# Patient Record
Sex: MALE | Race: WHITE | NOT HISPANIC OR LATINO | Employment: OTHER | RURAL
[De-identification: names, ages, dates, MRNs, and addresses within clinical notes are randomized per-mention and may not be internally consistent; named-entity substitution may affect disease eponyms.]

---

## 2020-08-11 ENCOUNTER — OFFICE VISIT (OUTPATIENT)
Dept: FAMILY MEDICINE CLINIC | Facility: CLINIC | Age: 40
End: 2020-08-11

## 2020-08-11 VITALS
DIASTOLIC BLOOD PRESSURE: 80 MMHG | SYSTOLIC BLOOD PRESSURE: 121 MMHG | WEIGHT: 222.8 LBS | OXYGEN SATURATION: 97 % | HEART RATE: 65 BPM | TEMPERATURE: 98.2 F | RESPIRATION RATE: 20 BRPM | HEIGHT: 74 IN | BODY MASS INDEX: 28.59 KG/M2

## 2020-08-11 DIAGNOSIS — F17.200 TOBACCO USE DISORDER: ICD-10-CM

## 2020-08-11 DIAGNOSIS — G35 MULTIPLE SCLEROSIS (HCC): Primary | ICD-10-CM

## 2020-08-11 PROBLEM — K21.9 ESOPHAGEAL REFLUX: Status: ACTIVE | Noted: 2020-08-11

## 2020-08-11 PROBLEM — J30.9 ALLERGIC RHINITIS: Status: ACTIVE | Noted: 2020-08-11

## 2020-08-11 PROBLEM — E78.5 HYPERLIPIDEMIA: Status: ACTIVE | Noted: 2020-08-11

## 2020-08-11 PROBLEM — E66.3 OVERWEIGHT (BMI 25.0-29.9): Status: ACTIVE | Noted: 2020-08-11

## 2020-08-11 PROCEDURE — 99213 OFFICE O/P EST LOW 20 MIN: CPT | Performed by: FAMILY MEDICINE

## 2020-08-11 RX ORDER — FLUTICASONE PROPIONATE 50 MCG
SPRAY, SUSPENSION (ML) NASAL
COMMUNITY
Start: 2015-11-16 | End: 2021-06-23

## 2020-08-11 RX ORDER — AMITRIPTYLINE HYDROCHLORIDE 25 MG/1
25 TABLET, FILM COATED ORAL DAILY
COMMUNITY
Start: 2015-11-19 | End: 2020-08-15

## 2020-08-11 RX ORDER — FINGOLIMOD HYDROCHLORIDE 0.5 MG/1
0.5 CAPSULE ORAL DAILY
COMMUNITY
Start: 2015-10-14 | End: 2020-08-15

## 2020-08-11 RX ORDER — GABAPENTIN 100 MG/1
100 CAPSULE ORAL 3 TIMES DAILY
Qty: 90 CAPSULE | Refills: 2 | Status: SHIPPED | OUTPATIENT
Start: 2020-08-11 | End: 2020-11-10 | Stop reason: SDUPTHER

## 2020-08-11 RX ORDER — CYCLOBENZAPRINE HCL 10 MG
10 TABLET ORAL
COMMUNITY
Start: 2015-10-05 | End: 2020-08-15

## 2020-08-11 RX ORDER — BUTALBITAL, ACETAMINOPHEN AND CAFFEINE 50; 325; 40 MG/1; MG/1; MG/1
1 TABLET ORAL
COMMUNITY
Start: 2015-08-19 | End: 2020-08-15

## 2020-08-11 RX ORDER — LANSOPRAZOLE 30 MG/1
CAPSULE, DELAYED RELEASE ORAL
COMMUNITY
Start: 2014-07-27 | End: 2021-06-23

## 2020-08-11 RX ORDER — MELOXICAM 15 MG/1
15 TABLET ORAL DAILY
COMMUNITY
Start: 2015-11-19 | End: 2020-08-15

## 2020-08-11 RX ORDER — CHLORPHENIRAMINE MALEATE 4 MG/1
4 TABLET ORAL
COMMUNITY
End: 2020-08-15

## 2020-08-11 NOTE — PROGRESS NOTES
Subjective   Frederic Wood is a 39 y.o. male.     Chief Complaint   Patient presents with   • Multiple Sclerosis       Migraine    This is a recurrent problem. The current episode started more than 1 year ago. The problem occurs constantly. The problem has been gradually worsening. The pain is located in the occipital, temporal and frontal region. The pain radiates to the left shoulder, left neck and left arm. The pain quality is not similar to prior headaches. Associated symptoms include blurred vision, dizziness, eye pain, a loss of balance, neck pain, numbness, tingling, a visual change and weakness. Pertinent negatives include no abdominal pain, abnormal behavior, anorexia, coughing, nausea or seizures. Associated symptoms comments:   . The symptoms are aggravated by activity, fatigue, emotional stress, weather changes and food (Heat, chocolate, sugar). Treatments tried: MS medication. The treatment provided no relief. His past medical history is significant for migraine headaches and obesity.            I personally reviewed and updated the patient's allergies, medications, problem list, and past medical, surgical, social, and family history.     Family History   Problem Relation Age of Onset   • Osteoporosis Mother    • Diabetes Mother    • Hypertension Mother    • Parkinsonism Father    • Cancer Maternal Grandfather    • Colon cancer Maternal Grandfather    • Heart attack Paternal Grandmother    • Stroke Paternal Grandmother    • Heart attack Paternal Grandfather    • Stroke Paternal Grandfather        Social History     Tobacco Use   • Smoking status: Former Smoker     Last attempt to quit: 2019     Years since quittin.2   • Smokeless tobacco: Never Used   Substance Use Topics   • Alcohol use: Not Currently   • Drug use: Never       History reviewed. No pertinent surgical history.    Patient Active Problem List   Diagnosis   • Allergic rhinitis   • Esophageal reflux   • Tobacco use disorder   •  "Hyperlipidemia   • Multiple sclerosis (CMS/HCC)   • Overweight (BMI 25.0-29.9)         Current Outpatient Medications:   •  fluticasone (FLONASE) 50 MCG/ACT nasal spray, into the nostril(s) as directed by provider., Disp: , Rfl:   •  lansoprazole (PREVACID) 30 MG capsule, , Disp: , Rfl:   •  gabapentin (NEURONTIN) 100 MG capsule, Take 1 capsule by mouth 3 (Three) Times a Day., Disp: 90 capsule, Rfl: 2         Review of Systems   Constitutional: Negative for chills and diaphoresis.   HENT: Negative for trouble swallowing and voice change.    Eyes: Positive for blurred vision and pain. Negative for visual disturbance.   Respiratory: Negative for cough and shortness of breath.    Cardiovascular: Negative for chest pain and palpitations.   Gastrointestinal: Negative for abdominal pain, anorexia and nausea.   Endocrine: Negative for polydipsia and polyphagia.   Genitourinary: Negative for hematuria.   Musculoskeletal: Positive for neck pain. Negative for neck stiffness.   Skin: Negative for color change and pallor.   Allergic/Immunologic: Negative for immunocompromised state.   Neurological: Positive for dizziness, tingling, weakness, numbness and loss of balance. Negative for seizures and syncope.   Hematological: Negative for adenopathy.   Psychiatric/Behavioral: Negative for sleep disturbance and suicidal ideas.       I have reviewed and confirmed the accuracy of the ROS as documented by the MA/LPN/RN Denton Dillon MD      Objective   /80 (BP Location: Right arm, Patient Position: Sitting, Cuff Size: Adult)   Pulse 65   Temp 98.2 °F (36.8 °C)   Resp 20   Ht 188 cm (74\")   Wt 101 kg (222 lb 12.8 oz)   SpO2 97%   BMI 28.61 kg/m²   BP Readings from Last 3 Encounters:   08/11/20 121/80   04/12/17 123/79   02/03/17 116/78     Wt Readings from Last 3 Encounters:   08/11/20 101 kg (222 lb 12.8 oz)   04/12/17 85.8 kg (189 lb 3.2 oz)   02/03/17 85.3 kg (188 lb 2 oz)     Physical Exam   Constitutional: He is " oriented to person, place, and time. He appears well-developed and well-nourished.   Eyes: Pupils are equal, round, and reactive to light. Conjunctivae, EOM and lids are normal. Right eye exhibits no discharge and no exudate. No foreign body present in the right eye. Left eye exhibits no discharge and no exudate. No foreign body present in the left eye. Right conjunctiva is not injected. Right conjunctiva has no hemorrhage. Left conjunctiva is not injected. Left conjunctiva has no hemorrhage. No scleral icterus. Right eye exhibits no nystagmus. Left eye exhibits no nystagmus.   Fundoscopic exam:       The right eye shows no arteriolar narrowing, no AV nicking, no exudate, no hemorrhage and no papilledema. The right eye shows no red reflex and no venous pulsations.        The left eye shows no arteriolar narrowing, no AV nicking, no exudate, no hemorrhage and no papilledema. The left eye shows no red reflex and no venous pulsations.   Cardiovascular: Normal rate, regular rhythm, S1 normal, S2 normal, normal heart sounds, intact distal pulses and normal pulses. Exam reveals no gallop and no friction rub.   No murmur heard.  Pulmonary/Chest: Effort normal and breath sounds normal. No accessory muscle usage or stridor. He has no decreased breath sounds. He has no wheezes. He has no rhonchi. He has no rales.   Abdominal: Soft. Normal appearance, normal aorta and bowel sounds are normal. He exhibits no distension, no pulsatile midline mass and no mass. There is no hepatosplenomegaly. There is no tenderness. There is no rigidity, no rebound, no guarding, no CVA tenderness and negative Short's sign. No hernia.   Neurological: He is alert and oriented to person, place, and time. He has normal strength and normal reflexes. He displays no tremor. No cranial nerve deficit or sensory deficit. He exhibits normal muscle tone. He displays no seizure activity. Coordination and gait normal.   Skin: Skin is warm and dry. Turgor is  normal. He is not diaphoretic. No pallor.         Assessment/Plan      Medications        Problem List         LOS    MS.  Worse, significant numbness, intermittent left-sided weakness, episodes of bladder incontinence..  Has been off treatment for several years secondary/no insurance.  History of Gilenya Rx.  Recommend neurology follow-up/consider treatment follow-up with Dr. Grimaldo scheduled.  Information for pursuing disability application given.  Start gabapentin.  Risk of significant morbidity from progression of MS without treatment discussed.  Multiple lesions with active lesion on l parietal lobe corresponding with right sided weakness 2013, + prior h/o optic neuritis resulting in vision loss, has been followed by optho in the past.  Start gabapentin.  History of tobacco use.  Congrats on quitting.  Recommend follow-up visit for comprehensive physical exam and screening tests.         Problem List Items Addressed This Visit        Unprioritized    Tobacco use disorder    Multiple sclerosis (CMS/HCC) - Primary              Expected course, medications, and adverse effects discussed.  Call or return if worsening or persistent symptoms.

## 2020-11-10 ENCOUNTER — OFFICE VISIT (OUTPATIENT)
Dept: FAMILY MEDICINE CLINIC | Facility: CLINIC | Age: 40
End: 2020-11-10

## 2020-11-10 VITALS — HEIGHT: 74 IN | BODY MASS INDEX: 26.95 KG/M2 | WEIGHT: 210 LBS

## 2020-11-10 DIAGNOSIS — F17.200 TOBACCO USE DISORDER: ICD-10-CM

## 2020-11-10 DIAGNOSIS — E66.3 OVERWEIGHT (BMI 25.0-29.9): ICD-10-CM

## 2020-11-10 DIAGNOSIS — R20.0 EXTREMITY NUMBNESS: ICD-10-CM

## 2020-11-10 DIAGNOSIS — G35 MULTIPLE SCLEROSIS (HCC): Primary | ICD-10-CM

## 2020-11-10 PROCEDURE — 99443 PR PHYS/QHP TELEPHONE EVALUATION 21-30 MIN: CPT | Performed by: FAMILY MEDICINE

## 2020-11-10 RX ORDER — GABAPENTIN 100 MG/1
300 CAPSULE ORAL 3 TIMES DAILY
Qty: 90 CAPSULE | Refills: 2 | Status: SHIPPED | OUTPATIENT
Start: 2020-11-10 | End: 2020-11-30

## 2020-11-10 NOTE — PROGRESS NOTES
Subjective   Frederic Wood is a 39 y.o. male.     Chief Complaint   Patient presents with   • Multiple Sclerosis       Frederic states he would like the gabapentin increased if possible. He said it has really helped but could be better. He is still having a lot of joint and nerve pain on the right side. He said his last episode was just recently and he was really fatigue and went numb in his legs. He still has prickly feelings in his left leg from the last episode.    Migraine   This is a recurrent problem. The current episode started more than 1 year ago. The problem occurs constantly. The problem has been gradually worsening. The pain is located in the occipital, temporal and frontal region. The pain radiates to the left shoulder, left neck and left arm. The pain quality is not similar to prior headaches. Associated symptoms include blurred vision, dizziness, eye pain, a loss of balance, neck pain, numbness, tingling, a visual change and weakness. Pertinent negatives include no abdominal pain, abnormal behavior, anorexia, coughing, nausea or seizures. Associated symptoms comments:   . The symptoms are aggravated by activity, fatigue, emotional stress, weather changes and food (Heat, chocolate, sugar). Treatments tried: MS medication. The treatment provided no relief. His past medical history is significant for migraine headaches and obesity.            I personally reviewed and updated the patient's allergies, medications, problem list, and past medical, surgical, social, and family history. I have reviewed and confirmed the accuracy of the History of Present Illness and Review of Symptoms as documented by the MA/ALMAN/RN. Denton Dillon MD    Family History   Problem Relation Age of Onset   • Osteoporosis Mother    • Diabetes Mother    • Hypertension Mother    • Parkinsonism Father    • Cancer Maternal Grandfather    • Colon cancer Maternal Grandfather    • Heart attack Paternal Grandmother    • Stroke Paternal  "Grandmother    • Heart attack Paternal Grandfather    • Stroke Paternal Grandfather        Social History     Tobacco Use   • Smoking status: Former Smoker     Quit date: 2019     Years since quittin.4   • Smokeless tobacco: Never Used   Substance Use Topics   • Alcohol use: Not Currently   • Drug use: Never       History reviewed. No pertinent surgical history.    Patient Active Problem List   Diagnosis   • Allergic rhinitis   • Esophageal reflux   • Tobacco use disorder   • Hyperlipidemia   • Multiple sclerosis (CMS/HCC)   • Overweight (BMI 25.0-29.9)   • Extremity numbness         Current Outpatient Medications:   •  fluticasone (FLONASE) 50 MCG/ACT nasal spray, into the nostril(s) as directed by provider., Disp: , Rfl:   •  gabapentin (NEURONTIN) 100 MG capsule, Take 3 capsules by mouth 3 (Three) Times a Day., Disp: 90 capsule, Rfl: 2  •  lansoprazole (PREVACID) 30 MG capsule, , Disp: , Rfl:          Review of Systems   Constitutional: Negative for chills and diaphoresis.   Eyes: Positive for blurred vision and pain. Negative for visual disturbance.   Respiratory: Negative for cough and shortness of breath.    Cardiovascular: Negative for chest pain and palpitations.   Gastrointestinal: Negative for abdominal pain, anorexia and nausea.   Endocrine: Negative for polydipsia and polyphagia.   Musculoskeletal: Positive for neck pain. Negative for neck stiffness.   Skin: Negative for color change and pallor.   Neurological: Positive for dizziness, tingling, weakness, numbness and loss of balance. Negative for seizures and syncope.   Hematological: Negative for adenopathy.   Psychiatric/Behavioral: Negative for hallucinations and suicidal ideas.       I have reviewed and confirmed the accuracy of the ROS as documented by the MA/LPN/RN Denton Dillon MD      Objective   Ht 188 cm (74\")   Wt 95.3 kg (210 lb)   BMI 26.96 kg/m²   BP Readings from Last 3 Encounters:   20 121/80   17 123/79   17 " 116/78     Wt Readings from Last 3 Encounters:   11/10/20 95.3 kg (210 lb)   08/11/20 101 kg (222 lb 12.8 oz)   04/12/17 85.8 kg (189 lb 3.2 oz)     Physical Exam    Recent Lab Results:    No results found for: HGBA1C     No results found for: LDL, LDLDIRECT  No results found for: CHOL  No results found for: TRIG  No results found for: HDL  No results found for: PSA  No results found for: WBC, HGB, HCT, MCV, PLT  No results found for: TSH, R0HMPYD, F5OHXOO   No results found for: GLUCOSE, BUN, CREATININE, EGFRIFNONA, EGFRIFAFRI, BCR, K, CO2, CALCIUM, PROTENTOTREF, ALBUMIN, LABIL2, BILIRUBIN, AST, ALT  No results found for: CLARICE, RF, SEDRATE   No results found for: CRP   No results found for: IRON, TIBC, FERRITIN   No results found for: [id number]     Frederic Wood consented to undergo a telephone visit today.  This format was necessitated by the current covid-19 virus pandemic.  22 minutes was spent on phone today with Jenna discussing his acute concerns of chronic medical problems.  Assessment/Plan      Medications        Problem List         LOS    MS.   improved today on gabapentin, dose increased., significant numbness, intermittent left-sided weakness, episodes of bladder incontinence..  Has been off treatment for several years secondary/no insurance.  History of Gilenya Rx.  Recommend neurology follow-up/consider treatment follow-up with Dr. Grimaldo he declines currently secondary to cost but states he will consider in the spring..  Information for pursuing disability application given.  Start gabapentin.  Risk of significant morbidity from progression of MS without treatment discussed.  Multiple lesions with active lesion on l parietal lobe corresponding with right sided weakness 2013, + prior h/o optic neuritis resulting in vision loss, has been followed by optho in the past.  Start gabapentin.  History of tobacco use.  Congrats on quitting.  Migraine headaches.  Improved currently on gabapentin.  Consider  Imitrex if persistent symptoms.  Triggers discussed.  Recommend follow-up visit for comprehensive physical exam and screening tests.      Problem List Items Addressed This Visit        Unprioritized    Tobacco use disorder    Multiple sclerosis (CMS/HCC) - Primary    Overweight (BMI 25.0-29.9)    Extremity numbness              Expected course, medications, and adverse effects discussed.  Call or return if worsening or persistent symptoms.

## 2020-11-30 RX ORDER — GABAPENTIN 100 MG/1
CAPSULE ORAL
Qty: 90 CAPSULE | Refills: 2 | Status: SHIPPED | OUTPATIENT
Start: 2020-11-30 | End: 2021-03-09 | Stop reason: SDUPTHER

## 2021-03-09 ENCOUNTER — OFFICE VISIT (OUTPATIENT)
Dept: FAMILY MEDICINE CLINIC | Facility: CLINIC | Age: 41
End: 2021-03-09

## 2021-03-09 VITALS
OXYGEN SATURATION: 98 % | HEART RATE: 77 BPM | WEIGHT: 221.6 LBS | SYSTOLIC BLOOD PRESSURE: 130 MMHG | BODY MASS INDEX: 28.44 KG/M2 | RESPIRATION RATE: 18 BRPM | DIASTOLIC BLOOD PRESSURE: 80 MMHG | HEIGHT: 74 IN | TEMPERATURE: 97.1 F

## 2021-03-09 DIAGNOSIS — G35 MULTIPLE SCLEROSIS (HCC): Primary | ICD-10-CM

## 2021-03-09 DIAGNOSIS — E66.3 OVERWEIGHT (BMI 25.0-29.9): ICD-10-CM

## 2021-03-09 DIAGNOSIS — F17.200 TOBACCO USE DISORDER: ICD-10-CM

## 2021-03-09 PROCEDURE — 99213 OFFICE O/P EST LOW 20 MIN: CPT | Performed by: FAMILY MEDICINE

## 2021-03-09 NOTE — PROGRESS NOTES
Subjective   Frederic Wood is a 40 y.o. male.     Chief Complaint   Patient presents with   • Multiple Sclerosis       Migraine   This is a recurrent problem. The current episode started more than 1 year ago. The problem occurs constantly. The problem has been gradually improving. The pain is located in the occipital, temporal and frontal region. The pain radiates to the left shoulder, left neck and left arm. The pain quality is not similar to prior headaches. The patient is experiencing no pain. Associated symptoms include blurred vision, dizziness, eye pain, a loss of balance, neck pain, numbness, tingling, a visual change and weakness. Pertinent negatives include no abdominal pain, abnormal behavior, anorexia, coughing, nausea or seizures. Associated symptoms comments:   . The symptoms are aggravated by activity, fatigue, emotional stress, weather changes and food (Heat, chocolate, sugar). Treatments tried: MS medication. The treatment provided no relief. His past medical history is significant for migraine headaches and obesity.   Joint Swelling  This is a chronic problem. The current episode started more than 1 year ago. The problem occurs intermittently. The problem has been unchanged. Associated symptoms include arthralgias, fatigue, headaches, joint swelling, myalgias, neck pain, numbness, a visual change and weakness. Pertinent negatives include no abdominal pain, anorexia, chest pain, chills, coughing, diaphoresis or nausea.            I personally reviewed and updated the patient's allergies, medications, problem list, and past medical, surgical, social, and family history. I have reviewed and confirmed the accuracy of the History of Present Illness and Review of Symptoms as documented by the MA/LPN/RN. Alison Guajardo MA    Family History   Problem Relation Age of Onset   • Osteoporosis Mother    • Diabetes Mother    • Hypertension Mother    • Parkinsonism Father    • Cancer Maternal Grandfather     • Colon cancer Maternal Grandfather    • Heart attack Paternal Grandmother    • Stroke Paternal Grandmother    • Heart attack Paternal Grandfather    • Stroke Paternal Grandfather        Social History     Tobacco Use   • Smoking status: Current Some Day Smoker     Types: Cigarettes     Start date: 1996   • Smokeless tobacco: Never Used   • Tobacco comment: Quit in 2019 and picked back up when stressed    Vaping Use   • Vaping Use: Never used   Substance Use Topics   • Alcohol use: Not Currently   • Drug use: Never       History reviewed. No pertinent surgical history.    Patient Active Problem List   Diagnosis   • Allergic rhinitis   • Esophageal reflux   • Tobacco use disorder   • Hyperlipidemia   • Multiple sclerosis (CMS/HCC)   • Overweight (BMI 25.0-29.9)   • Extremity numbness         Current Outpatient Medications:   •  gabapentin (NEURONTIN) 100 MG capsule, TAKE 3 CAPSULES BY MOUTH THREE TIMES DAILY, Disp: 90 capsule, Rfl: 2  •  fluticasone (FLONASE) 50 MCG/ACT nasal spray, into the nostril(s) as directed by provider., Disp: , Rfl:   •  lansoprazole (PREVACID) 30 MG capsule, , Disp: , Rfl:          Review of Systems   Constitutional: Positive for fatigue. Negative for chills and diaphoresis.   Eyes: Positive for blurred vision and pain. Negative for visual disturbance.   Respiratory: Negative for cough and shortness of breath.    Cardiovascular: Negative for chest pain and palpitations.   Gastrointestinal: Negative for abdominal pain, anorexia and nausea.   Endocrine: Negative for polydipsia and polyphagia.   Musculoskeletal: Positive for arthralgias, joint swelling, myalgias and neck pain. Negative for neck stiffness.   Skin: Negative for color change and pallor.   Neurological: Positive for dizziness, tingling, weakness, numbness and loss of balance. Negative for seizures and syncope.   Hematological: Negative for adenopathy.       I have reviewed and confirmed the accuracy of the ROS as documented by  "the MA/LPN/RN Alison Guajardo MA      Objective   Pulse 77   Temp 97.1 °F (36.2 °C)   Resp 18   Ht 188 cm (74\")   Wt 101 kg (221 lb 9.6 oz)   SpO2 98%   BMI 28.45 kg/m²   BP Readings from Last 3 Encounters:   08/11/20 121/80   04/12/17 123/79   02/03/17 116/78     Wt Readings from Last 3 Encounters:   03/09/21 101 kg (221 lb 9.6 oz)   11/10/20 95.3 kg (210 lb)   08/11/20 101 kg (222 lb 12.8 oz)     Physical Exam  Constitutional:       Appearance: Normal appearance. He is well-developed. He is not diaphoretic.   Cardiovascular:      Rate and Rhythm: Normal rate and regular rhythm.      Pulses: Normal pulses.      Heart sounds: Normal heart sounds, S1 normal and S2 normal. No murmur. No friction rub. No gallop.    Pulmonary:      Effort: Pulmonary effort is normal. No accessory muscle usage.      Breath sounds: Normal breath sounds. No stridor. No decreased breath sounds, wheezing, rhonchi or rales.   Abdominal:      General: Bowel sounds are normal. There is no distension.      Palpations: Abdomen is soft. Abdomen is not rigid. There is no mass or pulsatile mass.      Tenderness: There is no abdominal tenderness. There is no guarding or rebound. Negative signs include Short's sign.      Hernia: No hernia is present.   Skin:     General: Skin is warm and dry.      Coloration: Skin is not pale.   Neurological:      Mental Status: He is alert and oriented to person, place, and time.      Cranial Nerves: No cranial nerve deficit.      Sensory: No sensory deficit.      Motor: No tremor, abnormal muscle tone or seizure activity.      Coordination: Coordination normal.      Gait: Gait normal.      Deep Tendon Reflexes: Reflexes are normal and symmetric.         Data / Lab Results:    No results found for: HGBA1C     No results found for: LDL, LDLDIRECT  No results found for: CHOL  No results found for: TRIG  No results found for: HDL  No results found for: PSA  No results found for: WBC, HGB, HCT, MCV, PLT  No " results found for: TSH, L4YHNIW, A1WVHJM   No results found for: GLUCOSE, BUN, CREATININE, EGFRIFNONA, EGFRIFAFRI, BCR, K, CO2, CALCIUM, PROTENTOTREF, ALBUMIN, LABIL2, BILIRUBIN, AST, ALT  No results found for: CLARICE, RF, SEDRATE   No results found for: CRP   No results found for: IRON, TIBC, FERRITIN   No results found for: [id number]       Assessment/Plan      Medications        Problem List         LOS      MS.   improved today on increased dose of gabapentin., significant numbness, intermittent left-sided weakness, episodes of bladder incontinence..  Has been off treatment for several years secondary/no insurance.  History of Gilenya Rx.  Recommend neurology follow-up/consider treatment follow-up with Dr. Grimaldo he declines currently secondary to cost but states he will consider later this year.  Information for pursuing disability application given.  Risk of significant morbidity from progression of MS without treatment/he understands gabapentin treats the symptoms but does not modulate his immune response or affect the long-term course of MS...  Multiple lesions with active lesion on l parietal lobe corresponding with right sided weakness 2013, + prior h/o optic neuritis resulting in vision loss, has been followed by optho in the past.  Likely MS currently with significant fatigue, unable to work more than 3 or 4 hours consecutively, intermittent numbness/weakness of both legs.  Recommend check TSH, CBC, CMP he declines secondary to cost, but he states he will consider later this year.  History of tobacco use.  Congrats on quitting.  Migraine headaches.  Improved currently on gabapentin.  Consider Imitrex if persistent symptoms.  Triggers discussed.  Recommend follow-up visit for comprehensive physical exam and screening tests.        Diagnoses and all orders for this visit:    1. Multiple sclerosis (CMS/HCC) (Primary)  -     gabapentin (NEURONTIN) 100 MG capsule; Take 3 capsules by mouth 3 (Three) Times a  Day.  Dispense: 90 capsule; Refill: 2    2. Tobacco use disorder    3. Overweight (BMI 25.0-29.9)              Expected course, medications, and adverse effects discussed.  Call or return if worsening or persistent symptoms.  I wore protective equipment throughout this patient encounter including a mask, gloves, and eye protection.  Hand hygiene was performed before donning protective equipment and after removal when leaving the room. The complete contents of the Assessment and Plan and Data/Lab Results as documented above have been reviewed and addressed by myself with the patient today as part of an ongoing evaluation / treatment plan.  If some of the documentation has been copied from a previous note and is unchanged it indicates that this problem / plan has been assessed today but is stable from a previous visit and no changes have been recommended.

## 2021-03-16 RX ORDER — GABAPENTIN 100 MG/1
300 CAPSULE ORAL 3 TIMES DAILY
Qty: 90 CAPSULE | Refills: 2 | Status: SHIPPED | OUTPATIENT
Start: 2021-03-16 | End: 2021-03-22

## 2021-03-22 DIAGNOSIS — G35 MULTIPLE SCLEROSIS (HCC): ICD-10-CM

## 2021-03-22 RX ORDER — GABAPENTIN 100 MG/1
CAPSULE ORAL
Qty: 90 CAPSULE | Refills: 2 | Status: SHIPPED | OUTPATIENT
Start: 2021-03-22 | End: 2021-06-16 | Stop reason: SDUPTHER

## 2021-06-14 DIAGNOSIS — G35 MULTIPLE SCLEROSIS (HCC): ICD-10-CM

## 2021-06-14 RX ORDER — GABAPENTIN 100 MG/1
300 CAPSULE ORAL 3 TIMES DAILY
Qty: 90 CAPSULE | Refills: 2 | Status: CANCELLED | OUTPATIENT
Start: 2021-06-14

## 2021-06-14 NOTE — TELEPHONE ENCOUNTER
Caller: Frederic Wood    Relationship: Self    Best call back number: [phone]     Medication needed:   Requested Prescriptions     Pending Prescriptions Disp Refills   • gabapentin (NEURONTIN) 100 MG capsule 90 capsule 2     Sig: Take 3 capsules by mouth 3 (Three) Times a Day.       When do you need the refill by: TODAY    What additional details did the patient provide when requesting the medication:     PATIENT IS COMPLETELY OUT.   LAST OV 03/09/21  NEXT OV 06/23/21 FOR MED REFILL  CAN WE FILL THIS TO HOLD HIM OVER TO THE APPOINTMENT ?     Does the patient have less than a 3 day supply:  [x] Yes  [] No    What is the patient's preferred pharmacy: [id number] - [address] AT [facility] OF  [address] [facility] - [phone] [facility] [phone] FX

## 2021-06-16 DIAGNOSIS — G35 MULTIPLE SCLEROSIS (HCC): ICD-10-CM

## 2021-06-16 RX ORDER — GABAPENTIN 300 MG/1
300 CAPSULE ORAL 3 TIMES DAILY
Qty: 90 CAPSULE | Refills: 0 | Status: SHIPPED | OUTPATIENT
Start: 2021-06-16 | End: 2021-06-23 | Stop reason: SDUPTHER

## 2021-06-23 ENCOUNTER — OFFICE VISIT (OUTPATIENT)
Dept: FAMILY MEDICINE CLINIC | Facility: CLINIC | Age: 41
End: 2021-06-23

## 2021-06-23 VITALS
SYSTOLIC BLOOD PRESSURE: 122 MMHG | OXYGEN SATURATION: 98 % | WEIGHT: 225.4 LBS | BODY MASS INDEX: 28.93 KG/M2 | TEMPERATURE: 96.6 F | HEART RATE: 77 BPM | HEIGHT: 74 IN | DIASTOLIC BLOOD PRESSURE: 90 MMHG | RESPIRATION RATE: 18 BRPM

## 2021-06-23 DIAGNOSIS — G43.509 PERSISTENT MIGRAINE AURA WITHOUT CEREBRAL INFARCTION AND WITHOUT STATUS MIGRAINOSUS, NOT INTRACTABLE: ICD-10-CM

## 2021-06-23 DIAGNOSIS — G35 MULTIPLE SCLEROSIS (HCC): Primary | ICD-10-CM

## 2021-06-23 DIAGNOSIS — E66.3 OVERWEIGHT (BMI 25.0-29.9): ICD-10-CM

## 2021-06-23 DIAGNOSIS — F17.200 TOBACCO USE DISORDER: ICD-10-CM

## 2021-06-23 PROCEDURE — 99213 OFFICE O/P EST LOW 20 MIN: CPT | Performed by: FAMILY MEDICINE

## 2021-06-23 RX ORDER — GABAPENTIN 300 MG/1
300 CAPSULE ORAL 3 TIMES DAILY
Qty: 90 CAPSULE | Refills: 2 | Status: SHIPPED | OUTPATIENT
Start: 2021-06-23 | End: 2021-09-21

## 2021-08-03 ENCOUNTER — OFFICE VISIT (OUTPATIENT)
Dept: FAMILY MEDICINE CLINIC | Facility: CLINIC | Age: 41
End: 2021-08-03

## 2021-08-03 VITALS
RESPIRATION RATE: 18 BRPM | HEART RATE: 66 BPM | BODY MASS INDEX: 28.75 KG/M2 | SYSTOLIC BLOOD PRESSURE: 112 MMHG | TEMPERATURE: 97.1 F | HEIGHT: 74 IN | OXYGEN SATURATION: 98 % | DIASTOLIC BLOOD PRESSURE: 82 MMHG | WEIGHT: 224 LBS

## 2021-08-03 DIAGNOSIS — E66.3 OVERWEIGHT (BMI 25.0-29.9): ICD-10-CM

## 2021-08-03 DIAGNOSIS — F17.200 TOBACCO USE DISORDER: ICD-10-CM

## 2021-08-03 DIAGNOSIS — G35 MULTIPLE SCLEROSIS (HCC): Primary | ICD-10-CM

## 2021-08-03 DIAGNOSIS — R42 DIZZINESS: ICD-10-CM

## 2021-08-03 PROCEDURE — 99213 OFFICE O/P EST LOW 20 MIN: CPT | Performed by: FAMILY MEDICINE

## 2021-08-03 RX ORDER — PREDNISONE 10 MG/1
TABLET ORAL
Qty: 290 TABLET | Refills: 0 | Status: SHIPPED | OUTPATIENT
Start: 2021-08-03 | End: 2021-09-22 | Stop reason: SDUPTHER

## 2021-08-03 NOTE — PROGRESS NOTES
Subjective   Frederic Wood is a 40 y.o. male.     Chief Complaint   Patient presents with   • Numbness   • Migraine   • Dizziness       Numbess:  Symptoms first began several months ago. Onset was sudden and is worsening. Duration of each episode lasts is constant without gabapentin. The numbness is gradually increasing in severity and frequency. He describes the numbness as burning , pins and needles and tingling. The numbness affects arms, hands, legs and feet. Associated features include: muscle pain, migraines, urinary incontinence. Risk factors include: MS diagnosis.     Migraine   This is a recurrent problem. The current episode started more than 1 year ago. The problem occurs intermittently. The problem has been gradually improving. The pain is located in the occipital, frontal and temporal region. The pain radiates to the left arm, left shoulder and left neck. The pain quality is similar to prior headaches. The quality of the pain is described as dull and aching. The pain is at a severity of 3/10. The pain is mild. Associated symptoms include blurred vision, dizziness, eye pain, a loss of balance, scalp tenderness, sinus pressure, tingling and weakness. Pertinent negatives include no abdominal pain, abnormal behavior, anorexia, coughing, facial sweating, fever, nausea, neck pain, numbness, seizures, sore throat, swollen glands, visual change or vomiting. Associated symptoms comments:   . The symptoms are aggravated by activity, fatigue, emotional stress, weather changes and food (Heat, chocolate, sugar). Treatments tried: gabapentin and vitamins. The treatment provided no relief. His past medical history is significant for migraine headaches and obesity.   Dizziness  This is a recurrent problem. The current episode started 1 to 4 weeks ago. The problem occurs intermittently. The problem has been waxing and waning. Associated symptoms include headaches, vertigo and weakness. Pertinent negatives include no  abdominal pain, anorexia, arthralgias, change in bowel habit, chest pain, chills, congestion, coughing, diaphoresis, fatigue, fever, joint swelling, myalgias, nausea, neck pain, numbness, rash, sore throat, swollen glands, urinary symptoms, visual change or vomiting. Associated symptoms comments: Balance issues  . Exacerbated by: heat. He has tried drinking and rest for the symptoms. The treatment provided moderate relief.            I personally reviewed and updated the patient's allergies, medications, problem list, and past medical, surgical, social, and family history. I have reviewed and confirmed the accuracy of the History of Present Illness and Review of Symptoms as documented by the MA/LPN/RN. Denton Dillon MD    Family History   Problem Relation Age of Onset   • Osteoporosis Mother    • Diabetes Mother    • Hypertension Mother    • Parkinsonism Father    • Cancer Maternal Grandfather    • Colon cancer Maternal Grandfather    • Heart attack Paternal Grandmother    • Stroke Paternal Grandmother    • Heart attack Paternal Grandfather    • Stroke Paternal Grandfather        Social History     Tobacco Use   • Smoking status: Former Smoker     Types: Cigarettes     Start date:      Quit date: 2019     Years since quittin.6   • Smokeless tobacco: Never Used   • Tobacco comment: Quit in 2019 and picked back up when stressed    Vaping Use   • Vaping Use: Never used   Substance Use Topics   • Alcohol use: Not Currently   • Drug use: Never       History reviewed. No pertinent surgical history.    Patient Active Problem List   Diagnosis   • Allergic rhinitis   • Esophageal reflux   • Tobacco use disorder   • Hyperlipidemia   • Multiple sclerosis (CMS/[facility])   • Overweight (BMI 25.0-29.9)   • Extremity numbness   • Persistent migraine aura without cerebral infarction and without status migrainosus, not intractable   • Dizziness         Current Outpatient Medications:   •  gabapentin (NEURONTIN) 300 MG capsule,  "Take 1 capsule by mouth 3 (Three) Times a Day., Disp: 90 capsule, Rfl: 2  •  predniSONE (DELTASONE) 10 MG tablet, Take 8 tablets daily x 5 days, then 6 tablets daily x 5 days, then 4 tablets daily x 5 days, then 2 tablets daily x 5 days, then 1 tablet daily x 10 days, Disp: 290 tablet, Rfl: 0         Review of Systems   Constitutional: Negative for chills, diaphoresis, fatigue and fever.   HENT: Positive for sinus pressure. Negative for congestion, sore throat and swollen glands.    Eyes: Positive for blurred vision and pain. Negative for visual disturbance.   Respiratory: Negative for cough and shortness of breath.    Cardiovascular: Negative for chest pain and palpitations.   Gastrointestinal: Negative for abdominal pain, anorexia, change in bowel habit, nausea and vomiting.   Endocrine: Negative for polydipsia and polyphagia.   Musculoskeletal: Negative for arthralgias, joint swelling, myalgias, neck pain and neck stiffness.   Skin: Negative for color change, pallor and rash.   Neurological: Positive for dizziness, vertigo, tingling, weakness and loss of balance. Negative for seizures, syncope and numbness.   Hematological: Negative for adenopathy.   Psychiatric/Behavioral: Negative for hallucinations and suicidal ideas.       I have reviewed and confirmed the accuracy of the ROS as documented by the MA/LPN/RN Denton Dillon MD      Objective   /82   Pulse 66   Temp 97.1 °F (36.2 °C)   Resp 18   Ht 188 cm (74\")   Wt 102 kg (224 lb)   SpO2 98%   BMI 28.76 kg/m²   BP Readings from Last 3 Encounters:   08/03/21 112/82   06/23/21 122/90   03/09/21 130/80     Wt Readings from Last 3 Encounters:   08/03/21 102 kg (224 lb)   06/23/21 102 kg (225 lb 6.4 oz)   03/09/21 101 kg (221 lb 9.6 oz)     Physical Exam  Constitutional:       Appearance: Normal appearance. He is well-developed. He is not diaphoretic.   Cardiovascular:      Rate and Rhythm: Normal rate and regular rhythm.      Pulses: Normal pulses.      " Heart sounds: Normal heart sounds, S1 normal and S2 normal. No murmur heard.   No friction rub. No gallop.    Pulmonary:      Effort: Pulmonary effort is normal. No accessory muscle usage.      Breath sounds: Normal breath sounds. No stridor. No decreased breath sounds, wheezing, rhonchi or rales.   Abdominal:      General: Bowel sounds are normal. There is no distension.      Palpations: Abdomen is soft. Abdomen is not rigid. There is no mass or pulsatile mass.      Tenderness: There is no abdominal tenderness. There is no guarding or rebound. Negative signs include Short's sign.      Hernia: No hernia is present.   Skin:     General: Skin is warm and dry.      Coloration: Skin is not pale.   Neurological:      Mental Status: He is alert and oriented to person, place, and time.      Cranial Nerves: No cranial nerve deficit.      Sensory: No sensory deficit.      Motor: No tremor, abnormal muscle tone or seizure activity.      Coordination: Coordination normal.      Gait: Gait normal.      Deep Tendon Reflexes: Reflexes are normal and symmetric.         Data / Lab Results:    No results found for: HGBA1C     No results found for: LDL, LDLDIRECT  No results found for: CHOL  No results found for: TRIG  No results found for: HDL  No results found for: PSA  No results found for: WBC, HGB, HCT, MCV, PLT  No results found for: TSH, J1PQAHX, B0JQPGY   No results found for: GLUCOSE, BUN, CREATININE, EGFRIFNONA, EGFRIFAFRI, BCR, K, CO2, CALCIUM, PROTENTOTREF, ALBUMIN, LABIL2, BILIRUBIN, AST, ALT  No results found for: CLARICE, RF, SEDRATE   No results found for: CRP   No results found for: IRON, TIBC, FERRITIN   No results found for: [id number]       Assessment/Plan      Medications        Problem List         LOS  MS. flare currently start prednisone taper.  Recommend neurology follow-up declines currently secondary to cost.  Improved today on increased dose of gabapentin., significant numbness, intermittent left-sided  weakness, episodes of bladder incontinence..  Has been off treatment for several years secondary/no insurance.  History of Gilenya Rx.  Recommend neurology follow-up/consider treatment follow-up with Dr. Grimaldo he declines currently secondary to cost but states he will consider later this year.  Information for pursuing disability application given.  Risk of significant morbidity from progression of MS without treatment/he understands gabapentin treats the symptoms but does not modulate his immune response or affect the long-term course of MS...  Multiple lesions with active lesion on l parietal lobe corresponding with right sided weakness 2013, + prior h/o optic neuritis resulting in vision loss, has been followed by optho in the past.  Likely MS currently with significant fatigue, unable to work more than 3 or 4 hours consecutively, intermittent numbness/weakness of both legs.  Recommend check TSH, CBC, CMP he declines secondary to cost, but he states he will consider later this year.  History of tobacco use.  Congrats on quitting.  Migraine headaches.  Improved currently on gabapentin.  Consider Imitrex if persistent symptoms.  Triggers discussed.  Recommend follow-up visit for comprehensive physical exam and screening tests.        Diagnoses and all orders for this visit:    1. Multiple sclerosis (CMS/[facility]) (Primary)    2. Dizziness    3. Overweight (BMI 25.0-29.9)    4. Tobacco use disorder    Other orders  -     predniSONE (DELTASONE) 10 MG tablet; Take 8 tablets daily x 5 days, then 6 tablets daily x 5 days, then 4 tablets daily x 5 days, then 2 tablets daily x 5 days, then 1 tablet daily x 10 days  Dispense: 290 tablet; Refill: 0              Expected course, medications, and adverse effects discussed.  Call or return if worsening or persistent symptoms.  I wore protective equipment throughout this patient encounter including a mask, gloves, and eye protection.  Hand hygiene was performed before donning  protective equipment and after removal when leaving the room. The complete contents of the Assessment and Plan and Data/Lab Results as documented above have been reviewed and addressed by myself with the patient today as part of an ongoing evaluation / treatment plan.  If some of the documentation has been copied from a previous note and is unchanged it indicates that this problem / plan has been assessed today but is stable from a previous visit and no changes have been recommended.

## 2021-08-24 ENCOUNTER — TELEPHONE (OUTPATIENT)
Dept: FAMILY MEDICINE CLINIC | Facility: CLINIC | Age: 41
End: 2021-08-24

## 2021-08-24 DIAGNOSIS — G35 MULTIPLE SCLEROSIS (HCC): Primary | ICD-10-CM

## 2021-08-24 DIAGNOSIS — G43.509 PERSISTENT MIGRAINE AURA WITHOUT CEREBRAL INFARCTION AND WITHOUT STATUS MIGRAINOSUS, NOT INTRACTABLE: ICD-10-CM

## 2021-08-24 DIAGNOSIS — R20.0 EXTREMITY NUMBNESS: ICD-10-CM

## 2021-08-24 NOTE — TELEPHONE ENCOUNTER
Needing a referral to an MS physician at  [facility]. The fax # is  [phone]. Any questions call patient

## 2021-09-21 DIAGNOSIS — G35 MULTIPLE SCLEROSIS (HCC): ICD-10-CM

## 2021-09-21 RX ORDER — GABAPENTIN 300 MG/1
CAPSULE ORAL
Qty: 90 CAPSULE | Refills: 2 | Status: SHIPPED | OUTPATIENT
Start: 2021-09-21 | End: 2021-12-27

## 2021-09-22 ENCOUNTER — OFFICE VISIT (OUTPATIENT)
Dept: FAMILY MEDICINE CLINIC | Facility: CLINIC | Age: 41
End: 2021-09-22

## 2021-09-22 VITALS
SYSTOLIC BLOOD PRESSURE: 122 MMHG | HEART RATE: 82 BPM | BODY MASS INDEX: 28.83 KG/M2 | WEIGHT: 224.6 LBS | TEMPERATURE: 97.7 F | RESPIRATION RATE: 18 BRPM | DIASTOLIC BLOOD PRESSURE: 80 MMHG | HEIGHT: 74 IN | OXYGEN SATURATION: 99 %

## 2021-09-22 DIAGNOSIS — Z87.891 FORMER TOBACCO USE: ICD-10-CM

## 2021-09-22 DIAGNOSIS — G35 MULTIPLE SCLEROSIS (HCC): Primary | ICD-10-CM

## 2021-09-22 DIAGNOSIS — E66.3 OVERWEIGHT (BMI 25.0-29.9): ICD-10-CM

## 2021-09-22 PROCEDURE — 99213 OFFICE O/P EST LOW 20 MIN: CPT | Performed by: FAMILY MEDICINE

## 2021-09-22 RX ORDER — PREDNISONE 10 MG/1
TABLET ORAL
Qty: 290 TABLET | Refills: 0 | Status: SHIPPED | OUTPATIENT
Start: 2021-09-22 | End: 2022-05-02

## 2021-09-22 NOTE — PROGRESS NOTES
Subjective   Frederic Wood is a 40 y.o. male.     Chief Complaint   Patient presents with   • Multiple Sclerosis       Numbess:  Symptoms first began several months ago. Onset was sudden and is worsening. Duration of each episode lasts is constant without gabapentin. The numbness is gradually increasing in severity and frequency. He describes the numbness as burning , pins and needles and tingling. The numbness affects arms, hands, legs and feet. Associated features include: muscle pain, migraines, urinary incontinence. Risk factors include: MS diagnosis.     Migraine   This is a recurrent problem. The current episode started more than 1 year ago. The problem occurs intermittently. The problem has been waxing and waning. The pain is located in the occipital, frontal and temporal region. The pain radiates to the left arm, left shoulder and left neck. The pain quality is similar to prior headaches. The quality of the pain is described as dull and aching. The pain is at a severity of 3/10. The pain is mild. Associated symptoms include blurred vision, dizziness, eye pain, a loss of balance, scalp tenderness, sinus pressure, tingling and weakness. Pertinent negatives include no abdominal pain, abnormal behavior, anorexia, coughing, facial sweating, fever, nausea, neck pain, numbness, seizures, sore throat, swollen glands, visual change or vomiting. Associated symptoms comments:   . The symptoms are aggravated by activity, fatigue, emotional stress, weather changes and food (Heat, chocolate, sugar). Treatments tried: gabapentin and vitamins. The treatment provided no relief. His past medical history is significant for migraine headaches and obesity.   Dizziness  This is a recurrent problem. The current episode started 1 to 4 weeks ago. The problem occurs intermittently. The problem has been waxing and waning. Associated symptoms include headaches, vertigo and weakness. Pertinent negatives include no abdominal pain,  anorexia, arthralgias, change in bowel habit, chest pain, chills, congestion, coughing, diaphoresis, fatigue, fever, joint swelling, myalgias, nausea, neck pain, numbness, rash, sore throat, swollen glands, urinary symptoms, visual change or vomiting. Associated symptoms comments: Balance issues  . Exacerbated by: heat. He has tried drinking and rest for the symptoms. The treatment provided moderate relief.   Multiple Sclerosis  This is a chronic problem. The current episode started more than 1 year ago. The problem occurs daily. The problem has been waxing and waning. Associated symptoms include headaches, vertigo and weakness. Pertinent negatives include no abdominal pain, anorexia, arthralgias, change in bowel habit, chest pain, chills, congestion, coughing, diaphoresis, fatigue, fever, joint swelling, myalgias, nausea, neck pain, numbness, rash, sore throat, swollen glands, urinary symptoms, visual change or vomiting. The symptoms are aggravated by stress. The treatment provided mild relief.            I personally reviewed and updated the patient's allergies, medications, problem list, and past medical, surgical, social, and family history. I have reviewed and confirmed the accuracy of the History of Present Illness and Review of Symptoms as documented by the MA/LPN/RN. Denton Dillon MD    Family History   Problem Relation Age of Onset   • Osteoporosis Mother    • Diabetes Mother    • Hypertension Mother    • Parkinsonism Father    • Cancer Maternal Grandfather    • Colon cancer Maternal Grandfather    • Heart attack Paternal Grandmother    • Stroke Paternal Grandmother    • Heart attack Paternal Grandfather    • Stroke Paternal Grandfather        Social History     Tobacco Use   • Smoking status: Former Smoker     Types: Cigarettes     Start date:      Quit date: 2019     Years since quittin.7   • Smokeless tobacco: Never Used   • Tobacco comment: Quit in 2019 and picked back up when stressed     Vaping Use   • Vaping Use: Never used   Substance Use Topics   • Alcohol use: Not Currently   • Drug use: Never       History reviewed. No pertinent surgical history.    Patient Active Problem List   Diagnosis   • Allergic rhinitis   • Esophageal reflux   • Former tobacco use   • Hyperlipidemia   • Multiple sclerosis (CMS/HCC)   • Overweight (BMI 25.0-29.9)   • Extremity numbness   • Persistent migraine aura without cerebral infarction and without status migrainosus, not intractable   • Dizziness         Current Outpatient Medications:   •  gabapentin (NEURONTIN) 300 MG capsule, TAKE 1 CAPSULE BY MOUTH THREE TIMES DAILY, Disp: 90 capsule, Rfl: 2  •  predniSONE (DELTASONE) 10 MG tablet, Take 8 tablets daily x 5 days, then 6 tablets daily x 5 days, then 4 tablets daily x 5 days, then 2 tablets daily x 5 days, then 1 tablet daily x 10 days, Disp: 290 tablet, Rfl: 0         Review of Systems   Constitutional: Negative for chills, diaphoresis, fatigue and fever.   HENT: Positive for sinus pressure. Negative for congestion, sore throat, swollen glands, trouble swallowing and voice change.    Eyes: Positive for blurred vision and pain. Negative for visual disturbance.   Respiratory: Negative for cough and shortness of breath.    Cardiovascular: Negative for chest pain and palpitations.   Gastrointestinal: Negative for abdominal pain, anorexia, change in bowel habit, nausea and vomiting.   Endocrine: Negative for polydipsia and polyphagia.   Genitourinary: Negative for hematuria.   Musculoskeletal: Negative for arthralgias, joint swelling, myalgias, neck pain and neck stiffness.   Skin: Negative for color change, pallor and rash.   Allergic/Immunologic: Negative for immunocompromised state.   Neurological: Positive for dizziness, vertigo, tingling, weakness and loss of balance. Negative for seizures, syncope and numbness.   Hematological: Negative for adenopathy.   Psychiatric/Behavioral: Negative for hallucinations,  "sleep disturbance and suicidal ideas.       I have reviewed and confirmed the accuracy of the ROS as documented by the MA/LPN/RN Denton Dillon MD      Objective   /80   Pulse 82   Temp 97.7 °F (36.5 °C)   Resp 18   Ht 188 cm (74\")   Wt 102 kg (224 lb 9.6 oz)   SpO2 99%   BMI 28.84 kg/m²   BP Readings from Last 3 Encounters:   09/22/21 122/80   08/03/21 112/82   06/23/21 122/90     Wt Readings from Last 3 Encounters:   09/22/21 102 kg (224 lb 9.6 oz)   08/03/21 102 kg (224 lb)   06/23/21 102 kg (225 lb 6.4 oz)     Physical Exam  Constitutional:       Appearance: Normal appearance. He is well-developed. He is not diaphoretic.   Cardiovascular:      Rate and Rhythm: Normal rate and regular rhythm.      Pulses: Normal pulses.      Heart sounds: Normal heart sounds, S1 normal and S2 normal. No murmur heard.   No friction rub. No gallop.    Pulmonary:      Effort: Pulmonary effort is normal. No accessory muscle usage.      Breath sounds: Normal breath sounds. No stridor. No decreased breath sounds, wheezing, rhonchi or rales.   Abdominal:      General: Bowel sounds are normal. There is no distension.      Palpations: Abdomen is soft. Abdomen is not rigid. There is no mass or pulsatile mass.      Tenderness: There is no abdominal tenderness. There is no guarding or rebound. Negative signs include Short's sign.      Hernia: No hernia is present.   Skin:     General: Skin is warm and dry.      Coloration: Skin is not pale.   Neurological:      Mental Status: He is alert and oriented to person, place, and time.      Cranial Nerves: No cranial nerve deficit.      Sensory: No sensory deficit.      Motor: No tremor, abnormal muscle tone or seizure activity.      Coordination: Coordination normal.      Gait: Gait normal.      Deep Tendon Reflexes: Reflexes are normal and symmetric.         Data / Lab Results:    No results found for: HGBA1C     No results found for: LDL, LDLDIRECT  No results found for: CHOL  No " results found for: TRIG  No results found for: HDL  No results found for: PSA  No results found for: WBC, HGB, HCT, MCV, PLT  No results found for: TSH, O7UOPPH, L3WPHCZ   No results found for: GLUCOSE, BUN, CREATININE, EGFRIFNONA, EGFRIFAFRI, BCR, K, CO2, CALCIUM, PROTENTOTREF, ALBUMIN, LABIL2, BILIRUBIN, AST, ALT  No results found for: CLARICE, RF, SEDRATE   No results found for: CRP   No results found for: IRON, TIBC, FERRITIN   No results found for: [id number]       Assessment/Plan      Medications        Problem List         LOS    MS. flare currently start prednisone taper.  Neurology follow-up upcoming.  Overall improved on increased dose of gabapentin., significant numbness, intermittent left-sided weakness, episodes of bladder incontinence..  Has been off treatment for several years secondary/no insurance.  History of Gilenya Rx.  Recommend neurology follow-up/consider treatment follow-up with Dr. Grimaldo he agrees currently/has follow-up visit upcoming.  Information for pursuing disability application given.  Risk of significant morbidity from progression of MS without treatment/he understands gabapentin treats the symptoms but does not modulate his immune response or affect the long-term course of MS...  Multiple lesions with active lesion on l parietal lobe corresponding with right sided weakness 2013, + prior h/o optic neuritis resulting in vision loss, has been followed by optho in the past.  Likely MS currently with significant fatigue, unable to work more than 3 or 4 hours consecutively, intermittent numbness/weakness of both legs.  Recommend check TSH, CBC, CMP he declines secondary to cost, but he states he will consider later this year.  History of tobacco use.  Congrats on quitting.  Migraine headaches.  Improved currently on gabapentin.  Consider Imitrex if persistent symptoms.  Triggers discussed.  Recommend follow-up visit for comprehensive physical exam and screening tests.         Diagnoses  and all orders for this visit:    1. Multiple sclerosis (CMS/HCC) (Primary)  -     predniSONE (DELTASONE) 10 MG tablet; Take 8 tablets daily x 5 days, then 6 tablets daily x 5 days, then 4 tablets daily x 5 days, then 2 tablets daily x 5 days, then 1 tablet daily x 10 days  Dispense: 290 tablet; Refill: 0    2. Overweight (BMI 25.0-29.9)    3. Former tobacco use              Expected course, medications, and adverse effects discussed.  Call or return if worsening or persistent symptoms.  I wore protective equipment throughout this patient encounter including a mask, gloves, and eye protection.  Hand hygiene was performed before donning protective equipment and after removal when leaving the room. The complete contents of the Assessment and Plan and Data/Lab Results as documented above have been reviewed and addressed by myself with the patient today as part of an ongoing evaluation / treatment plan.  If some of the documentation has been copied from a previous note and is unchanged it indicates that this problem / plan has been assessed today but is stable from a previous visit and no changes have been recommended.

## 2021-09-30 ENCOUNTER — TELEPHONE (OUTPATIENT)
Dept: FAMILY MEDICINE CLINIC | Facility: CLINIC | Age: 41
End: 2021-09-30

## 2021-09-30 DIAGNOSIS — B37.0 ORAL THRUSH: Primary | ICD-10-CM

## 2021-09-30 RX ORDER — DIPHENHYDRAMINE, LIDOCAINE, NYSTATIN
5 KIT ORAL 4 TIMES DAILY
Qty: 480 ML | Refills: 0 | Status: SHIPPED | OUTPATIENT
Start: 2021-09-30 | End: 2022-10-31

## 2021-09-30 RX ORDER — DIPHENHYDRAMINE, LIDOCAINE, NYSTATIN
5 KIT ORAL 4 TIMES DAILY
Qty: 480 ML | Refills: 0
Start: 2021-09-30 | End: 2021-09-30

## 2021-09-30 NOTE — TELEPHONE ENCOUNTER
Caller: Frederic Wood    Relationship: Self    Best call back number: [phone]    What medication are you requesting: SOMETHING FOR THRUSH    What are your current symptoms: PATIENT'S MOUTH IS BROKE OUT AND RED AND BLISTERS AND LUMPY FROM ONE SIDE OF HIS TONGUE TO THE OTHER    How long have you been experiencing symptoms: 3 DAYS AGO    Have you had these symptoms before:    [] Yes  [x] No    Have you been treated for these symptoms before:   [] Yes  [x] No    If a prescription is needed, what is your preferred pharmacy and phone number: [facility][id number] - [place]  [address] [place] [address] NW AT [facility] OF  & [address] - [phone]  - [phone] FX     Additional notes:  PATIENT BELIEVES THAT IT'S THE STEROIDS THAT IS CAUSING THIS TO HIS MOUTH.  HE IS BACKING OFF OF THIS MEDICATION.

## 2021-11-19 ENCOUNTER — OFFICE VISIT (OUTPATIENT)
Dept: FAMILY MEDICINE CLINIC | Facility: CLINIC | Age: 41
End: 2021-11-19

## 2021-11-19 VITALS
BODY MASS INDEX: 29.39 KG/M2 | TEMPERATURE: 98.4 F | DIASTOLIC BLOOD PRESSURE: 80 MMHG | WEIGHT: 229 LBS | HEIGHT: 74 IN | RESPIRATION RATE: 18 BRPM | SYSTOLIC BLOOD PRESSURE: 132 MMHG | OXYGEN SATURATION: 98 % | HEART RATE: 106 BPM

## 2021-11-19 DIAGNOSIS — Z72.0 TOBACCO USE: ICD-10-CM

## 2021-11-19 DIAGNOSIS — R20.0 NUMBNESS AND TINGLING OF BOTH LEGS: ICD-10-CM

## 2021-11-19 DIAGNOSIS — G25.81 RESTLESS LEG: ICD-10-CM

## 2021-11-19 DIAGNOSIS — G35 MULTIPLE SCLEROSIS (HCC): Primary | ICD-10-CM

## 2021-11-19 DIAGNOSIS — R20.2 NUMBNESS AND TINGLING OF BOTH LEGS: ICD-10-CM

## 2021-11-19 DIAGNOSIS — E78.5 HYPERLIPIDEMIA, UNSPECIFIED HYPERLIPIDEMIA TYPE: ICD-10-CM

## 2021-11-19 DIAGNOSIS — E66.3 OVERWEIGHT (BMI 25.0-29.9): ICD-10-CM

## 2021-11-19 PROCEDURE — 99213 OFFICE O/P EST LOW 20 MIN: CPT | Performed by: FAMILY MEDICINE

## 2021-11-19 RX ORDER — ROPINIROLE 1 MG/1
1-2 TABLET, FILM COATED ORAL NIGHTLY PRN
Qty: 60 TABLET | Refills: 2 | Status: SHIPPED | OUTPATIENT
Start: 2021-11-19 | End: 2022-05-02

## 2021-11-19 RX ORDER — THIAMINE HCL 50 MG
50 TABLET ORAL DAILY
COMMUNITY

## 2021-11-19 RX ORDER — LORATADINE 10 MG/1
CAPSULE, LIQUID FILLED ORAL
COMMUNITY

## 2021-11-19 RX ORDER — OMEPRAZOLE 20 MG/1
20 CAPSULE, DELAYED RELEASE ORAL DAILY
COMMUNITY
End: 2022-05-02 | Stop reason: SDUPTHER

## 2021-12-26 DIAGNOSIS — G35 MULTIPLE SCLEROSIS (HCC): ICD-10-CM

## 2021-12-27 RX ORDER — GABAPENTIN 300 MG/1
CAPSULE ORAL
Qty: 90 CAPSULE | Refills: 2 | Status: SHIPPED | OUTPATIENT
Start: 2021-12-27 | End: 2022-03-14 | Stop reason: SDUPTHER

## 2022-03-14 DIAGNOSIS — G35 MULTIPLE SCLEROSIS: ICD-10-CM

## 2022-03-14 RX ORDER — GABAPENTIN 300 MG/1
300 CAPSULE ORAL 3 TIMES DAILY
Qty: 90 CAPSULE | Refills: 2 | Status: SHIPPED | OUTPATIENT
Start: 2022-03-14 | End: 2022-05-02 | Stop reason: SDUPTHER

## 2022-03-14 NOTE — TELEPHONE ENCOUNTER
Caller: Frederic Wood    Relationship: Self    Best call back number: [phone]    Requested Prescriptions:   Requested Prescriptions     Pending Prescriptions Disp Refills   • gabapentin (NEURONTIN) 300 MG capsule 90 capsule 2     Sig: Take 1 capsule by mouth 3 (Three) Times a Day.        Pharmacy where request should be sent: [facility] [id number] [place] [address] AT [facility] OF  & [facility] - [phone] [place] [phone] FX     Does the patient have less than a 3 day supply:  [x] Yes  [] No    Zach Juan Rep   03/14/22 14:15 EDT

## 2022-05-02 ENCOUNTER — OFFICE VISIT (OUTPATIENT)
Dept: FAMILY MEDICINE CLINIC | Facility: CLINIC | Age: 42
End: 2022-05-02

## 2022-05-02 VITALS
RESPIRATION RATE: 18 BRPM | WEIGHT: 226 LBS | DIASTOLIC BLOOD PRESSURE: 80 MMHG | HEART RATE: 86 BPM | HEIGHT: 74 IN | TEMPERATURE: 98.2 F | OXYGEN SATURATION: 97 % | SYSTOLIC BLOOD PRESSURE: 124 MMHG | BODY MASS INDEX: 29 KG/M2

## 2022-05-02 DIAGNOSIS — K21.9 GASTROESOPHAGEAL REFLUX DISEASE WITHOUT ESOPHAGITIS: ICD-10-CM

## 2022-05-02 DIAGNOSIS — Z87.891 HISTORY OF TOBACCO USE: ICD-10-CM

## 2022-05-02 DIAGNOSIS — E66.3 OVERWEIGHT (BMI 25.0-29.9): ICD-10-CM

## 2022-05-02 DIAGNOSIS — G35 MULTIPLE SCLEROSIS: Primary | ICD-10-CM

## 2022-05-02 PROCEDURE — 99213 OFFICE O/P EST LOW 20 MIN: CPT | Performed by: FAMILY MEDICINE

## 2022-05-02 RX ORDER — ERGOCALCIFEROL 1.25 MG/1
1 CAPSULE ORAL
COMMUNITY
Start: 2022-03-24

## 2022-05-02 RX ORDER — GABAPENTIN 300 MG/1
300 CAPSULE ORAL 3 TIMES DAILY
Qty: 90 CAPSULE | Refills: 2 | Status: SHIPPED | OUTPATIENT
Start: 2022-05-02 | End: 2022-08-09

## 2022-05-02 RX ORDER — DIROXIMEL FUMARATE 231 MG/1
2 CAPSULE ORAL
COMMUNITY

## 2022-05-02 RX ORDER — CITALOPRAM 20 MG/1
20 TABLET ORAL DAILY
COMMUNITY
Start: 2022-03-28 | End: 2022-10-31 | Stop reason: SDUPTHER

## 2022-05-02 RX ORDER — OMEPRAZOLE 40 MG/1
40 CAPSULE, DELAYED RELEASE ORAL DAILY
Qty: 90 CAPSULE | Refills: 3 | Status: SHIPPED | OUTPATIENT
Start: 2022-05-02 | End: 2023-02-27 | Stop reason: SDUPTHER

## 2022-05-12 NOTE — ASSESSMENT & PLAN NOTE
BMI is >= 25 and < 30. (Overweight) The following options were offered after discussion: exercise counseling/recommendations and nutrition counseling/recommendations

## 2022-08-09 DIAGNOSIS — G35 MULTIPLE SCLEROSIS: ICD-10-CM

## 2022-08-09 RX ORDER — GABAPENTIN 300 MG/1
CAPSULE ORAL
Qty: 90 CAPSULE | Refills: 2 | Status: SHIPPED | OUTPATIENT
Start: 2022-08-09 | End: 2022-09-12

## 2022-09-10 DIAGNOSIS — G35 MULTIPLE SCLEROSIS: ICD-10-CM

## 2022-09-12 RX ORDER — GABAPENTIN 300 MG/1
CAPSULE ORAL
Qty: 90 CAPSULE | Refills: 2 | Status: SHIPPED | OUTPATIENT
Start: 2022-09-12 | End: 2022-10-31

## 2022-10-31 ENCOUNTER — OFFICE VISIT (OUTPATIENT)
Dept: FAMILY MEDICINE CLINIC | Facility: CLINIC | Age: 42
End: 2022-10-31

## 2022-10-31 VITALS
BODY MASS INDEX: 29.29 KG/M2 | WEIGHT: 228.2 LBS | TEMPERATURE: 98.2 F | HEIGHT: 74 IN | OXYGEN SATURATION: 98 % | HEART RATE: 101 BPM | DIASTOLIC BLOOD PRESSURE: 70 MMHG | SYSTOLIC BLOOD PRESSURE: 126 MMHG | RESPIRATION RATE: 16 BRPM

## 2022-10-31 DIAGNOSIS — G35 MULTIPLE SCLEROSIS: Primary | ICD-10-CM

## 2022-10-31 DIAGNOSIS — E66.3 OVERWEIGHT (BMI 25.0-29.9): ICD-10-CM

## 2022-10-31 DIAGNOSIS — F33.8 OTHER RECURRENT DEPRESSIVE DISORDERS: ICD-10-CM

## 2022-10-31 PROCEDURE — 99213 OFFICE O/P EST LOW 20 MIN: CPT | Performed by: FAMILY MEDICINE

## 2022-10-31 RX ORDER — GABAPENTIN 300 MG/1
600 CAPSULE ORAL 2 TIMES DAILY
Qty: 60 CAPSULE | Refills: 2 | Status: SHIPPED | OUTPATIENT
Start: 2022-10-31 | End: 2022-12-19

## 2022-10-31 RX ORDER — CITALOPRAM 20 MG/1
20 TABLET ORAL DAILY
Qty: 30 TABLET | Refills: 2 | Status: SHIPPED | OUTPATIENT
Start: 2022-10-31 | End: 2023-01-24

## 2022-11-28 ENCOUNTER — TELEPHONE (OUTPATIENT)
Dept: FAMILY MEDICINE CLINIC | Facility: CLINIC | Age: 42
End: 2022-11-28

## 2022-11-28 ENCOUNTER — OFFICE VISIT (OUTPATIENT)
Dept: FAMILY MEDICINE CLINIC | Facility: CLINIC | Age: 42
End: 2022-11-28

## 2022-11-28 VITALS
RESPIRATION RATE: 18 BRPM | HEIGHT: 74 IN | OXYGEN SATURATION: 98 % | SYSTOLIC BLOOD PRESSURE: 138 MMHG | WEIGHT: 232.6 LBS | TEMPERATURE: 98.6 F | DIASTOLIC BLOOD PRESSURE: 88 MMHG | BODY MASS INDEX: 29.85 KG/M2 | HEART RATE: 103 BPM

## 2022-11-28 DIAGNOSIS — J10.1 INFLUENZA A: ICD-10-CM

## 2022-11-28 DIAGNOSIS — G35 MULTIPLE SCLEROSIS: ICD-10-CM

## 2022-11-28 DIAGNOSIS — J11.1 FLU: ICD-10-CM

## 2022-11-28 DIAGNOSIS — J06.9 ACUTE URI: Primary | ICD-10-CM

## 2022-11-28 DIAGNOSIS — R05.9 COUGH, UNSPECIFIED TYPE: ICD-10-CM

## 2022-11-28 LAB
EXPIRATION DATE: ABNORMAL
FLUAV AG UPPER RESP QL IA.RAPID: DETECTED
FLUBV AG UPPER RESP QL IA.RAPID: NOT DETECTED
INTERNAL CONTROL: ABNORMAL
Lab: ABNORMAL
SARS-COV-2 AG UPPER RESP QL IA.RAPID: NOT DETECTED

## 2022-11-28 PROCEDURE — 87428 SARSCOV & INF VIR A&B AG IA: CPT | Performed by: FAMILY MEDICINE

## 2022-11-28 PROCEDURE — 99213 OFFICE O/P EST LOW 20 MIN: CPT | Performed by: FAMILY MEDICINE

## 2022-11-28 RX ORDER — HYDROCODONE BITARTRATE AND ACETAMINOPHEN 10; 325 MG/1; MG/1
TABLET ORAL
COMMUNITY
Start: 2022-11-09 | End: 2022-11-28

## 2022-11-28 RX ORDER — FLUTICASONE PROPIONATE 50 MCG
1 SPRAY, SUSPENSION (ML) NASAL DAILY
COMMUNITY

## 2022-11-28 RX ORDER — OMEPRAZOLE 20 MG/1
20 CAPSULE, DELAYED RELEASE ORAL DAILY
COMMUNITY
End: 2022-11-28 | Stop reason: SDUPTHER

## 2022-11-28 RX ORDER — HYDROCODONE BITARTRATE AND HOMATROPINE METHYLBROMIDE ORAL SOLUTION 5; 1.5 MG/5ML; MG/5ML
5 LIQUID ORAL NIGHTLY PRN
Qty: 180 ML | Refills: 0 | Status: SHIPPED | OUTPATIENT
Start: 2022-11-28 | End: 2023-02-03

## 2022-11-28 RX ORDER — OSELTAMIVIR PHOSPHATE 75 MG/1
75 CAPSULE ORAL 2 TIMES DAILY
Qty: 20 CAPSULE | Refills: 0 | Status: SHIPPED | OUTPATIENT
Start: 2022-11-28 | End: 2023-02-03

## 2022-11-28 RX ORDER — CIPROFLOXACIN 500 MG/1
500 TABLET, FILM COATED ORAL 2 TIMES DAILY
Qty: 20 TABLET | Refills: 0 | Status: SHIPPED | OUTPATIENT
Start: 2022-11-28 | End: 2023-02-03

## 2022-11-28 RX ORDER — MULTIVITAMIN WITH IRON
TABLET ORAL
COMMUNITY

## 2022-11-28 NOTE — TELEPHONE ENCOUNTER
Caller: Frederic Wood    Relationship: Self    Best call back number: [phone]    What medication are you requesting: RELIEF    What are your current symptoms: HIGH FEVER, ACHINESS, SNEEZING, COUGHING, DIZZINESS, HEADACHE    How long have you been experiencing symptoms: SINCE Saturday, 11/26/22    Have you had these symptoms before:    [] Yes  [x] No    Have you been treated for these symptoms before:   [] Yes  [x] No    If a prescription is needed, what is your preferred pharmacy and phone number: [id number] - [place], [place]  [address] [address] [address] AT [facility] OF  &  - [phone]  - [phone] FX     Additional notes: PATIENT STATES HE HAS BEEN EXPERIENCING SYMPTOMS LISTED ABOVE FOR ALMOST 3 DAYS, AS OF TODAY, 11/28/22. HE STATES HE HAS HAD 2 NEGATIVE COVID TESTS AND IS THINKING IT IS THE FLU. PATIENT IS REQUESTING FOR A NEW MEDICATION TO RELIEVE SYMPTOMS, WITHOUT HAVING TO COME IN FOR AN APPOINTMENT.

## 2022-12-15 PROBLEM — J10.1 INFLUENZA A: Status: ACTIVE | Noted: 2022-12-15

## 2022-12-18 DIAGNOSIS — G35 MULTIPLE SCLEROSIS: ICD-10-CM

## 2022-12-19 RX ORDER — GABAPENTIN 300 MG/1
CAPSULE ORAL
Qty: 90 CAPSULE | Refills: 0 | Status: SHIPPED | OUTPATIENT
Start: 2022-12-19 | End: 2023-02-03 | Stop reason: SDUPTHER

## 2022-12-30 ENCOUNTER — OFFICE VISIT (OUTPATIENT)
Dept: FAMILY MEDICINE CLINIC | Facility: CLINIC | Age: 42
End: 2022-12-30

## 2022-12-30 VITALS
WEIGHT: 278.2 LBS | RESPIRATION RATE: 18 BRPM | HEART RATE: 100 BPM | SYSTOLIC BLOOD PRESSURE: 126 MMHG | TEMPERATURE: 97.8 F | DIASTOLIC BLOOD PRESSURE: 76 MMHG | OXYGEN SATURATION: 98 % | HEIGHT: 74 IN | BODY MASS INDEX: 35.7 KG/M2

## 2022-12-30 DIAGNOSIS — J06.9 UPPER RESPIRATORY TRACT INFECTION, UNSPECIFIED TYPE: Primary | ICD-10-CM

## 2022-12-30 DIAGNOSIS — J18.9 COMMUNITY ACQUIRED PNEUMONIA OF RIGHT LOWER LOBE OF LUNG: ICD-10-CM

## 2022-12-30 DIAGNOSIS — J06.9 ACUTE URI: ICD-10-CM

## 2022-12-30 DIAGNOSIS — Z87.891 FORMER TOBACCO USE: ICD-10-CM

## 2022-12-30 PROCEDURE — 99213 OFFICE O/P EST LOW 20 MIN: CPT | Performed by: STUDENT IN AN ORGANIZED HEALTH CARE EDUCATION/TRAINING PROGRAM

## 2022-12-30 RX ORDER — AZITHROMYCIN 250 MG/1
TABLET, FILM COATED ORAL
Qty: 6 TABLET | Refills: 0 | Status: SHIPPED | OUTPATIENT
Start: 2022-12-30 | End: 2023-02-03

## 2022-12-30 NOTE — PROGRESS NOTES
Subjective   Frederic Wood is a 42 y.o. male.     Chief Complaint   Patient presents with   • URI       URI   This is a new problem. The current episode started yesterday. The problem has been gradually worsening. There has been no fever. Associated symptoms include congestion, coughing and sneezing. Pertinent negatives include no abdominal pain, chest pain, diarrhea, dysuria, ear pain, headaches, nausea, neck pain, plugged ear sensation, sore throat, vomiting or wheezing. Treatments tried: mucinex cough syrup, zyrtec D. The treatment provided mild relief.        The following portions of the patient's history were reviewed and updated as appropriate: allergies, current medications, past family history, past medical history, past social history, past surgical history and problem list.    Allergies:  Allergies   Allergen Reactions   • Cephalexin Rash       Social History:  Social History     Socioeconomic History   • Marital status:    Tobacco Use   • Smoking status: Former     Packs/day: 1.50     Years: 18.00     Pack years: 27.00     Types: Cigarettes     Start date:      Quit date:      Years since quittin.9   • Smokeless tobacco: Former   • Tobacco comments:     Quit in 2019 and picked back up when stressed    Vaping Use   • Vaping Use: Never used   Substance and Sexual Activity   • Alcohol use: Not Currently   • Drug use: Never   • Sexual activity: Defer       Family History:  Family History   Problem Relation Age of Onset   • Osteoporosis Mother    • Diabetes Mother    • Hypertension Mother    • Parkinsonism Father    • Cancer Maternal Grandfather    • Colon cancer Maternal Grandfather    • Heart attack Paternal Grandmother    • Stroke Paternal Grandmother    • Heart attack Paternal Grandfather    • Stroke Paternal Grandfather        Past Medical History :  Patient Active Problem List   Diagnosis   • Allergic rhinitis   • Esophageal reflux   • Tobacco use   • Hyperlipidemia   • Multiple  sclerosis (HCC)   • Overweight (BMI 25.0-29.9)   • Extremity numbness   • Persistent migraine aura without cerebral infarction and without status migrainosus, not intractable   • Dizziness   • Numbness and tingling of both legs   • Influenza A       Medication List:  Outpatient Encounter Medications as of 12/30/2022   Medication Sig Dispense Refill   • citalopram (CeleXA) 20 MG tablet Take 1 tablet by mouth Daily. 30 tablet 2   • Cyanocobalamin (Vitamin B 12) 100 MCG lozenge Take  by mouth.     • Diroximel Fumarate, Starter, (Vumerity, Starter,) 231 MG capsule delayed-release Take 2 capsules by mouth.     • ergocalciferol (ERGOCALCIFEROL) 1.25 MG (47028 UT) capsule Take 1 capsule by mouth Every 7 (Seven) Days.     • fluticasone (FLONASE) 50 MCG/ACT nasal spray 1 spray into the nostril(s) as directed by provider Daily.     • gabapentin (NEURONTIN) 300 MG capsule TAKE 1 CAPSULE BY MOUTH THREE TIMES DAILY 90 capsule 0   • Loratadine 10 MG capsule Take  by mouth.     • Magnesium 250 MG tablet Take  by mouth.     • omeprazole (priLOSEC) 40 MG capsule Take 1 capsule by mouth Daily. 90 capsule 3   • Thiamine HCl (vitamin B-1) 50 MG tablet Take 50 mg by mouth Daily.     • azithromycin (ZITHROMAX) 250 MG tablet Take 2 tablets the first day, then 1 tablet daily for 4 days. 6 tablet 0   • ciprofloxacin (CIPRO) 500 MG tablet Take 1 tablet by mouth 2 (Two) Times a Day. 20 tablet 0   • HYDROcodone Bit-Homatrop MBr (HYCODAN) 5-1.5 MG/5ML solution Take 5 mL by mouth At Night As Needed for Cough. 180 mL 0   • oseltamivir (Tamiflu) 75 MG capsule Take 1 capsule by mouth 2 (Two) Times a Day. 20 capsule 0     No facility-administered encounter medications on file as of 12/30/2022.       Past Surgical History:  History reviewed. No pertinent surgical history.    Review of Systems:  Review of Systems   HENT: Positive for congestion and sneezing. Negative for ear pain and sore throat.    Respiratory: Positive for cough. Negative for  "wheezing.    Cardiovascular: Negative for chest pain.   Gastrointestinal: Negative for abdominal pain, diarrhea, nausea and vomiting.   Genitourinary: Negative for dysuria.   Musculoskeletal: Negative for neck pain.       I have reviewed and confirmed the accuracy of the HPI and ROS as documented by the MA/LPN/RN Kelsea Arthur MD    Vital Signs:  Visit Vitals  /76   Pulse 100   Temp 97.8 °F (36.6 °C)   Resp 18   Ht 188 cm (74\")   Wt 126 kg (278 lb 3.2 oz)   SpO2 98%   BMI 35.72 kg/m²       Physical Exam  Vitals reviewed.   Constitutional:       Appearance: Normal appearance.   HENT:      Head: Normocephalic and atraumatic.      Mouth/Throat:      Mouth: Mucous membranes are moist.      Pharynx: Oropharynx is clear.   Eyes:      General: No scleral icterus.     Extraocular Movements: Extraocular movements intact.      Conjunctiva/sclera: Conjunctivae normal.      Pupils: Pupils are equal, round, and reactive to light.   Cardiovascular:      Rate and Rhythm: Normal rate and regular rhythm.      Heart sounds:     No friction rub. No gallop.   Pulmonary:      Effort: Pulmonary effort is normal. No respiratory distress.      Breath sounds: Normal breath sounds. No wheezing.      Comments: Course/crackles isolated to the right lower lobe, dullness to percussion in right lower lobe concerning for possible early pneumonia  Abdominal:      General: Bowel sounds are normal. There is no distension.      Palpations: Abdomen is soft.      Tenderness: There is no abdominal tenderness.   Musculoskeletal:         General: No swelling, tenderness or deformity. Normal range of motion.      Cervical back: Normal range of motion. No rigidity or tenderness.   Lymphadenopathy:      Cervical: No cervical adenopathy.   Skin:     General: Skin is warm.      Capillary Refill: Capillary refill takes less than 2 seconds.      Findings: No lesion or rash.   Neurological:      General: No focal deficit present.      Mental Status: He is " alert and oriented to person, place, and time. Mental status is at baseline.      Gait: Gait normal.   Psychiatric:         Behavior: Behavior normal.         Thought Content: Thought content normal.         Assessment and Plan:  Problem List Items Addressed This Visit    None  Visit Diagnoses     Upper respiratory tract infection, unspecified type    -  Primary    Relevant Medications    azithromycin (ZITHROMAX) 250 MG tablet    Other Relevant Orders    POCT SARS-CoV-2 Antigen JESSICA    Acute URI        Relevant Medications    azithromycin (ZITHROMAX) 250 MG tablet    Former tobacco use        Community acquired pneumonia of right lower lobe of lung              An After Visit Summary and PPPS were given to the patient.    Patient presents today with URI symptoms he did just have the flu a couple weeks ago, it is possible that he is having a postviral immunosuppression in addition to the fact that he is on therapy for his autoimmune disease in the form of MS thus I will proceed with a prescription for azithromycin we also discussed supportive care.  He may follow-up if his symptoms persist or worsen    I wore protective equipment throughout this patient encounter to include mask and eye protection. Hand hygiene was performed before donning protective equipment and after removal when leaving the room.

## 2023-01-24 DIAGNOSIS — F33.8 OTHER RECURRENT DEPRESSIVE DISORDERS: ICD-10-CM

## 2023-01-24 RX ORDER — CITALOPRAM 20 MG/1
20 TABLET ORAL DAILY
Qty: 30 TABLET | Refills: 2 | Status: SHIPPED | OUTPATIENT
Start: 2023-01-24

## 2023-02-01 PROBLEM — F33.8 OTHER RECURRENT DEPRESSIVE DISORDERS (HCC): Status: ACTIVE | Noted: 2023-02-01

## 2023-02-01 NOTE — PROGRESS NOTES
Subjective   Frederic Wood is a 42 y.o. male.     Chief Complaint   Patient presents with   • Multiple Sclerosis   • Depression       History of Present Illness  Frederic is here for refill on his Gapapentin.   Multiple Sclerosis  This is a chronic problem. The current episode started more than 1 year ago. The problem has been gradually worsening. Pertinent negatives include no abdominal pain, chest pain, chills, diaphoresis or nausea. The symptoms are aggravated by exertion and walking. Treatments tried: Gabapentin.   Depression  Visit Type: follow-up  Patient presents with the following symptoms: depressed mood, insomnia ( periods of not sleeping well), irritability, nervousness/anxiety and panic.  Patient is not experiencing: palpitations, shortness of breath and suicidal ideas.  Hours of sleep per night: some days he gets only 3-4 hours.  Sleep quality: good  Compliance with medications:  %                 I personally reviewed and updated the patient's allergies, medications, problem list, and past medical, surgical, social, and family history. I have reviewed and confirmed the accuracy of the History of Present Illness and Review of Symptoms as documented by the MA/LPN/RN. Denton Dillon MD    Family History   Problem Relation Age of Onset   • Osteoporosis Mother    • Diabetes Mother    • Hypertension Mother    • Parkinsonism Father    • Cancer Maternal Grandfather    • Colon cancer Maternal Grandfather    • Heart attack Paternal Grandmother    • Stroke Paternal Grandmother    • Heart attack Paternal Grandfather    • Stroke Paternal Grandfather        Social History     Tobacco Use   • Smoking status: Every Day     Packs/day: 1.50     Years: 18.00     Pack years: 27.00     Types: Cigarettes     Start date: 1996   • Smokeless tobacco: Former   • Tobacco comments:     Quit in 2019 and picked back up when stressed    Vaping Use   • Vaping Use: Never used   Substance Use Topics   • Alcohol use: Not Currently    • Drug use: Never       History reviewed. No pertinent surgical history.    Patient Active Problem List   Diagnosis   • Allergic rhinitis   • Esophageal reflux   • Tobacco use   • Hyperlipidemia   • Multiple sclerosis (HCC)   • Overweight with body mass index (BMI) of 29 to 29.9 in adult   • Extremity numbness   • Persistent migraine aura without cerebral infarction and without status migrainosus, not intractable   • Dizziness   • Numbness and tingling of both legs   • Influenza A   • Other recurrent depressive disorders (HCC)         Current Outpatient Medications:   •  amantadine (SYMMETREL) 100 MG tablet, TAKE 1 TABLET BY MOUTH IN THE AM FOR 1 WEEK, THEN INCREASE TO 1 TABLET IN MORNING AND AT NOON, Disp: , Rfl:   •  citalopram (CeleXA) 20 MG tablet, TAKE 1 TABLET BY MOUTH DAILY, Disp: 30 tablet, Rfl: 2  •  Cyanocobalamin (Vitamin B 12) 100 MCG lozenge, Take  by mouth., Disp: , Rfl:   •  Diroximel Fumarate, Starter, (Vumerity, Starter,) 231 MG capsule delayed-release, Take 2 capsules by mouth., Disp: , Rfl:   •  ergocalciferol (ERGOCALCIFEROL) 1.25 MG (13629 UT) capsule, Take 1 capsule by mouth Every 7 (Seven) Days., Disp: , Rfl:   •  fluticasone (FLONASE) 50 MCG/ACT nasal spray, 1 spray into the nostril(s) as directed by provider Daily., Disp: , Rfl:   •  gabapentin (NEURONTIN) 300 MG capsule, Take 1 capsule by mouth 3 (Three) Times a Day., Disp: 90 capsule, Rfl: 0  •  Loratadine 10 MG capsule, Take  by mouth., Disp: , Rfl:   •  Magnesium 250 MG tablet, Take  by mouth., Disp: , Rfl:   •  omeprazole (priLOSEC) 40 MG capsule, Take 1 capsule by mouth Daily., Disp: 90 capsule, Rfl: 3  •  Thiamine HCl (vitamin B-1) 50 MG tablet, Take 50 mg by mouth Daily., Disp: , Rfl:          Review of Systems   Constitutional: Positive for irritability. Negative for chills and diaphoresis.   Eyes: Negative for visual disturbance.   Respiratory: Negative for shortness of breath.    Cardiovascular: Negative for chest pain and  "palpitations.   Gastrointestinal: Negative for abdominal pain and nausea.   Endocrine: Negative for polydipsia and polyphagia.   Musculoskeletal: Negative for neck stiffness.   Skin: Negative for color change and pallor.   Neurological: Negative for seizures and syncope.   Hematological: Negative for adenopathy.   Psychiatric/Behavioral: Positive for depressed mood. Negative for hallucinations and suicidal ideas. The patient is nervous/anxious and has insomnia ( periods of not sleeping well).        I have reviewed and confirmed the accuracy of the ROS as documented by the MA/LPN/RN Denton Dillon MD      Objective   /76 (BP Location: Right arm, Patient Position: Sitting, Cuff Size: Adult)   Pulse 92   Temp 98.2 °F (36.8 °C)   Resp 16   Ht 188 cm (74\")   Wt 105 kg (232 lb 3.2 oz)   SpO2 99%   BMI 29.81 kg/m²   BP Readings from Last 3 Encounters:   02/03/23 124/76   12/30/22 126/76   11/28/22 138/88     Wt Readings from Last 3 Encounters:   02/03/23 105 kg (232 lb 3.2 oz)   12/30/22 126 kg (278 lb 3.2 oz)   11/28/22 106 kg (232 lb 9.6 oz)     Physical Exam  Constitutional:       Appearance: Normal appearance. He is well-developed. He is not diaphoretic.   Cardiovascular:      Rate and Rhythm: Normal rate and regular rhythm.      Pulses: Normal pulses.      Heart sounds: Normal heart sounds, S1 normal and S2 normal. No murmur heard.    No friction rub. No gallop.   Pulmonary:      Effort: Pulmonary effort is normal. No accessory muscle usage.      Breath sounds: Normal breath sounds. No stridor. No decreased breath sounds, wheezing, rhonchi or rales.   Abdominal:      General: Bowel sounds are normal. There is no distension.      Palpations: Abdomen is soft. Abdomen is not rigid. There is no mass or pulsatile mass.      Tenderness: There is no abdominal tenderness. There is no guarding or rebound. Negative signs include Short's sign.      Hernia: No hernia is present.   Skin:     General: Skin is warm " and dry.      Coloration: Skin is not pale.   Neurological:      Mental Status: He is alert and oriented to person, place, and time.      Cranial Nerves: No cranial nerve deficit.      Sensory: No sensory deficit.      Motor: No tremor, abnormal muscle tone or seizure activity.      Coordination: Coordination normal.      Gait: Gait normal.      Deep Tendon Reflexes: Reflexes are normal and symmetric.         Data / Lab Results:    No results found for: HGBA1C     No results found for: LDL, LDLDIRECT  No results found for: CHOL  No results found for: TRIG  No results found for: HDL  No results found for: PSA  Lab Results   Component Value Date    WBC 4.61 06/13/2022    HGB 14.6 06/13/2022    HCT 43.8 06/13/2022    MCV 92.6 06/13/2022     06/13/2022     No results found for: TSH, V4DFWPT, J6VPKAA, THYROIDAB   No results found for: GLUCOSE, BUN, CREATININE, EGFRIFNONA, EGFRIFAFRI, BCR, K, CO2, CALCIUM, PROTENTOTREF, ALBUMIN, LABIL2, BILIRUBIN, AST, ALT  No results found for: CLARICE, RF, SEDRATE   No results found for: CRP   No results found for: IRON, TIBC, FERRITIN   Lab Results   Component Value Date    [id number] 799 12/30/2021          Assessment & Plan      Medications        Problem List         LOS      Flu A.  With fever, well-hydrated on exam, no respiratory difficulty.  Start Tamiflu/antibiotics.  Increase fluid intake.  Signs symptoms dehydration discussed.  Call return if fever worsening symptoms.  On Vumerity followed by rheumatology.  MS.  Improved currently on Vumerity, followed by neurology, recent repeat imagery with active lesion by 1.  Followed by neurology Dr. Gonzalez.  Overall improved on increased dose of gabapentin., significant numbness, intermittent left-sided weakness, episodes of bladder incontinence.. Had been off treatment for several years secondary/no insurance.  History of Gilenya Rx.  Risk of significant morbidity from progression of MS without treatment/he understands gabapentin  treats the symptoms but does not modulate his immune response or affect the long-term course of MS...  Multiple lesions with active lesion on l parietal lobe corresponding with right sided weakness 2013, + prior h/o optic neuritis resulting in vision loss, has been followed by optho in the past.  Likely MS currently with significant fatigue, unable to work more than 3 or 4 hours consecutively, intermittent numbness/weakness of both legs.  Has had benign CBC, CMP per rheumatology, recommend check TSH/fasting lipid panel, availability of screening blood work through health care discussed.  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  Depression.  Improved today on citalopram. good social support.  Follow-up recheck.  Call return if worsening symptoms.  History of tobacco use.  Congrats on quitting.  Migraine headaches.  Improved currently on gabapentin.  Consider Imitrex if persistent symptoms.  Triggers discussed.  GERD.  Stable on PPI.  Restless legs.    Clinically improved today.  Did not tolerate Requip.  Recommend follow-up visit for comprehensive physical exam and screening tests.  Recommend COVID-19 vaccine he declines.        Diagnoses and all orders for this visit:    1. Multiple sclerosis (HCC) (Primary)  -     gabapentin (NEURONTIN) 300 MG capsule; Take 1 capsule by mouth 3 (Three) Times a Day.  Dispense: 90 capsule; Refill: 0    2. Other recurrent depressive disorders (HCC)    3. Tobacco use    4. Overweight with body mass index (BMI) of 29 to 29.9 in adult  Assessment & Plan:  Patient's (Body mass index is 29.81 kg/m².) indicates that they are overweight with health conditions that include dyslipidemias . Weight is unchanged. BMI is is above average; BMI management plan is completed. We discussed portion control and increasing exercise.               Expected course, medications, and adverse effects discussed.  Call or return if worsening or  persistent symptoms.  I wore protective equipment throughout this patient encounter including a mask, gloves, and eye protection.  Hand hygiene was performed before donning protective equipment and after removal when leaving the room. The complete contents of the Assessment and Plan and Data/Lab Results as documented above have been reviewed and addressed by myself with the patient today as part of an ongoing evaluation / treatment plan.  If some of the documentation has been copied from a previous note and is unchanged it indicates that this problem / plan has been assessed today but is stable from a previous visit and no changes have been recommended.

## 2023-02-03 ENCOUNTER — OFFICE VISIT (OUTPATIENT)
Dept: FAMILY MEDICINE CLINIC | Facility: CLINIC | Age: 43
End: 2023-02-03
Payer: MEDICAID

## 2023-02-03 VITALS
OXYGEN SATURATION: 99 % | HEART RATE: 92 BPM | SYSTOLIC BLOOD PRESSURE: 124 MMHG | BODY MASS INDEX: 29.8 KG/M2 | WEIGHT: 232.2 LBS | DIASTOLIC BLOOD PRESSURE: 76 MMHG | TEMPERATURE: 98.2 F | HEIGHT: 74 IN | RESPIRATION RATE: 16 BRPM

## 2023-02-03 DIAGNOSIS — Z72.0 TOBACCO USE: ICD-10-CM

## 2023-02-03 DIAGNOSIS — F33.8 OTHER RECURRENT DEPRESSIVE DISORDERS: ICD-10-CM

## 2023-02-03 DIAGNOSIS — E66.3 OVERWEIGHT WITH BODY MASS INDEX (BMI) OF 29 TO 29.9 IN ADULT: ICD-10-CM

## 2023-02-03 DIAGNOSIS — G35 MULTIPLE SCLEROSIS: Primary | ICD-10-CM

## 2023-02-03 PROCEDURE — 99213 OFFICE O/P EST LOW 20 MIN: CPT | Performed by: FAMILY MEDICINE

## 2023-02-03 RX ORDER — AMANTADINE HYDROCHLORIDE 100 MG/1
TABLET ORAL
COMMUNITY
Start: 2023-01-26

## 2023-02-03 RX ORDER — GABAPENTIN 300 MG/1
300 CAPSULE ORAL 3 TIMES DAILY
Qty: 90 CAPSULE | Refills: 0 | Status: SHIPPED | OUTPATIENT
Start: 2023-02-03 | End: 2023-02-14 | Stop reason: SDUPTHER

## 2023-02-03 NOTE — ASSESSMENT & PLAN NOTE
Patient's (Body mass index is 29.81 kg/m².) indicates that they are overweight with health conditions that include dyslipidemias . Weight is unchanged. BMI is is above average; BMI management plan is completed. We discussed portion control and increasing exercise.

## 2023-02-14 DIAGNOSIS — G35 MULTIPLE SCLEROSIS: ICD-10-CM

## 2023-02-16 RX ORDER — GABAPENTIN 300 MG/1
300 CAPSULE ORAL 3 TIMES DAILY
Qty: 90 CAPSULE | Refills: 2 | Status: SHIPPED | OUTPATIENT
Start: 2023-02-16

## 2023-02-27 ENCOUNTER — TELEPHONE (OUTPATIENT)
Dept: FAMILY MEDICINE CLINIC | Facility: CLINIC | Age: 43
End: 2023-02-27

## 2023-02-27 DIAGNOSIS — K21.9 GASTROESOPHAGEAL REFLUX DISEASE WITHOUT ESOPHAGITIS: ICD-10-CM

## 2023-02-27 RX ORDER — OMEPRAZOLE 40 MG/1
40 CAPSULE, DELAYED RELEASE ORAL DAILY
Qty: 90 CAPSULE | Refills: 3 | Status: SHIPPED | OUTPATIENT
Start: 2023-02-27

## 2023-02-27 NOTE — TELEPHONE ENCOUNTER
Sent in Omeprazole to [facility]. Clariten D Can be purchased over the counter as this is not a medication that is prescribed

## 2023-02-27 NOTE — TELEPHONE ENCOUNTER
Caller: Frederic Wood    Relationship: Self    Best call back number: [phone]    What medication are you requesting: CLARITIN D AND OMEPRAZOLE    What are your current symptoms: DISCUSSED WITH PROVIDER AT LAST PRIMARY CARE APPOINTMENT    How long have you been experiencing symptoms:   ALLERGIES YEARS  STOMACH ISSUES YEARS    Have you had these symptoms before:    [x] Yes  [] No    Have you been treated for these symptoms before:   [] Yes  [x] No    If a prescription is needed, what is your preferred pharmacy and phone number:  [id number] - [place]  [address] AT [facility] OF  &  - [phone] PH - [phone] FX  P: [phone]  F: [phone]        Additional notes:NOW HAS MEDICATION INSURANCE

## 2023-04-26 DIAGNOSIS — F33.8 OTHER RECURRENT DEPRESSIVE DISORDERS: ICD-10-CM

## 2023-04-26 RX ORDER — CITALOPRAM 20 MG/1
20 TABLET ORAL DAILY
Qty: 30 TABLET | Refills: 2 | Status: SHIPPED | OUTPATIENT
Start: 2023-04-26

## 2023-05-03 NOTE — PROGRESS NOTES
Subjective   Frederic Wood is a 42 y.o. male.     Chief Complaint   Patient presents with   • Depression   • Multiple Sclerosis   • Sinus Problem       History of Present Illness  Frederic is here for refill on his Gapapentin. He states his depression is doing better, and has not caused too many issues lately. He has seen by the nurse at work for sinus issues. He tested negative for Covid and Strep. He was prescribed Azithromycin on 5/3/2023 from his work nurse and he states it did not settle well for him, with reaction of mouth swelling with sores, headaches, and nausea.   Multiple Sclerosis  This is a chronic problem. The current episode started more than 1 year ago. The problem has been gradually worsening. Associated symptoms include congestion, coughing, headaches and a sore throat. Pertinent negatives include no abdominal pain, chest pain, chills, diaphoresis or nausea. The symptoms are aggravated by exertion and walking. Treatments tried: Gabapentin.   Depression  Visit Type: follow-up  Patient presents with the following symptoms: depressed mood, insomnia ( periods of not sleeping well), irritability, nervousness/anxiety and panic.  Patient is not experiencing: palpitations, shortness of breath and suicidal ideas.  Hours of sleep per night: some days he gets only 3-4 hours.  Sleep quality: good  Compliance with medications:  %        Sinus Problem  This is a new problem. The current episode started in the past 7 days. The problem has been gradually worsening since onset. Associated symptoms include congestion, coughing, ear pain, headaches, sinus pressure, sneezing and a sore throat. Pertinent negatives include no chills, diaphoresis or shortness of breath. The treatment provided mild relief.            I personally reviewed and updated the patient's allergies, medications, problem list, and past medical, surgical, social, and family history. I have reviewed and confirmed the accuracy of the History of  Present Illness and Review of Symptoms as documented by the MA/LPN/RN. Denton Dillon MD    Family History   Problem Relation Age of Onset   • Osteoporosis Mother    • Diabetes Mother    • Hypertension Mother    • Parkinsonism Father    • Cancer Maternal Grandfather    • Colon cancer Maternal Grandfather    • Heart attack Paternal Grandmother    • Stroke Paternal Grandmother    • Heart attack Paternal Grandfather    • Stroke Paternal Grandfather        Social History     Tobacco Use   • Smoking status: Every Day     Packs/day: 1.50     Years: 18.00     Pack years: 27.00     Types: Cigarettes     Start date: 1996   • Smokeless tobacco: Never   • Tobacco comments:     Quit in 2019 and picked back up when stressed, cut back to pack a day in 2020.    Vaping Use   • Vaping Use: Never used   Substance Use Topics   • Alcohol use: Not Currently   • Drug use: Never       History reviewed. No pertinent surgical history.    Patient Active Problem List   Diagnosis   • Allergic rhinitis   • Esophageal reflux   • Tobacco use   • Hyperlipidemia   • Multiple sclerosis (HCC)   • Overweight with body mass index (BMI) of 29 to 29.9 in adult   • Extremity numbness   • Persistent migraine aura without cerebral infarction and without status migrainosus, not intractable   • Dizziness   • Numbness and tingling of both legs   • Influenza A   • Other recurrent depressive disorders         Current Outpatient Medications:   •  amantadine (SYMMETREL) 100 MG tablet, TAKE 1 TABLET BY MOUTH IN THE AM FOR 1 WEEK, THEN INCREASE TO 1 TABLET IN MORNING AND AT NOON, Disp: , Rfl:   •  citalopram (CeleXA) 20 MG tablet, TAKE 1 TABLET BY MOUTH DAILY, Disp: 30 tablet, Rfl: 2  •  Cyanocobalamin (Vitamin B 12) 100 MCG lozenge, Take  by mouth., Disp: , Rfl:   •  Diroximel Fumarate, Starter, (Vumerity, Starter,) 231 MG capsule delayed-release, Take 2 capsules by mouth., Disp: , Rfl:   •  ergocalciferol (ERGOCALCIFEROL) 1.25 MG (24541 UT) capsule, Take 1 capsule  "by mouth Every 7 (Seven) Days., Disp: , Rfl:   •  fluticasone (FLONASE) 50 MCG/ACT nasal spray, 1 spray into the nostril(s) as directed by provider Daily., Disp: , Rfl:   •  gabapentin (NEURONTIN) 300 MG capsule, Take 1 capsule by mouth 3 (Three) Times a Day., Disp: 90 capsule, Rfl: 2  •  Loratadine 10 MG capsule, Take  by mouth., Disp: , Rfl:   •  Magnesium 250 MG tablet, Take  by mouth., Disp: , Rfl:   •  omeprazole (priLOSEC) 40 MG capsule, Take 1 capsule by mouth Daily., Disp: 90 capsule, Rfl: 3  •  Thiamine HCl (vitamin B-1) 50 MG tablet, Take 1 tablet by mouth Daily., Disp: , Rfl:   •  ciprofloxacin (CIPRO) 500 MG tablet, Take 1 tablet by mouth 2 (Two) Times a Day., Disp: 20 tablet, Rfl: 0         Review of Systems   Constitutional: Positive for irritability. Negative for chills and diaphoresis.   HENT: Positive for congestion, ear pain, sinus pressure, sneezing and sore throat.    Eyes: Negative for visual disturbance.   Respiratory: Positive for cough. Negative for shortness of breath.    Cardiovascular: Negative for chest pain and palpitations.   Gastrointestinal: Negative for abdominal pain and nausea.   Endocrine: Negative for polydipsia and polyphagia.   Musculoskeletal: Negative for neck stiffness.   Skin: Negative for color change and pallor.   Neurological: Negative for seizures and syncope.   Hematological: Negative for adenopathy.   Psychiatric/Behavioral: Positive for depressed mood. Negative for hallucinations and suicidal ideas. The patient is nervous/anxious and has insomnia ( periods of not sleeping well).        I have reviewed and confirmed the accuracy of the ROS as documented by the MA/LPN/RN Denton Dillon MD      Objective   /80 (BP Location: Right arm, Patient Position: Sitting, Cuff Size: Adult)   Pulse 84   Temp 98.2 °F (36.8 °C) (Temporal)   Resp 16   Ht 188 cm (74\")   Wt 104 kg (229 lb 6.4 oz)   SpO2 97%   BMI 29.45 kg/m²   BP Readings from Last 3 Encounters:   05/05/23 " 124/80   02/03/23 124/76   12/30/22 126/76     Wt Readings from Last 3 Encounters:   05/05/23 104 kg (229 lb 6.4 oz)   02/03/23 105 kg (232 lb 3.2 oz)   12/30/22 126 kg (278 lb 3.2 oz)     Physical Exam  Constitutional:       Appearance: Normal appearance. He is well-developed. He is not diaphoretic.   HENT:      Head: Normocephalic.      Right Ear: Hearing, ear canal and external ear normal. A middle ear effusion is present. Tympanic membrane is erythematous.      Left Ear: Hearing, ear canal and external ear normal. A middle ear effusion is present. Tympanic membrane is erythematous.      Nose: Congestion present.      Right Sinus: Maxillary sinus tenderness and frontal sinus tenderness present.      Left Sinus: Maxillary sinus tenderness and frontal sinus tenderness present.      Mouth/Throat:      Pharynx: Posterior oropharyngeal erythema present.      Tonsils: No tonsillar abscesses. 1+ on the right. 1+ on the left.   Eyes:      General: Lids are normal.      Conjunctiva/sclera: Conjunctivae normal.      Pupils: Pupils are equal, round, and reactive to light.   Neck:      Meningeal: Brudzinski's sign and Kernig's sign absent.   Cardiovascular:      Rate and Rhythm: Normal rate and regular rhythm.      Pulses: Normal pulses.      Heart sounds: Normal heart sounds, S1 normal and S2 normal. No murmur heard.    No friction rub. No gallop.   Pulmonary:      Effort: Pulmonary effort is normal. No accessory muscle usage or respiratory distress.      Breath sounds: Normal breath sounds. No stridor. No decreased breath sounds, wheezing, rhonchi or rales.   Abdominal:      General: Bowel sounds are normal. There is no distension.      Palpations: Abdomen is soft. Abdomen is not rigid. There is no mass or pulsatile mass.      Tenderness: There is no abdominal tenderness. There is no guarding or rebound. Negative signs include Short's sign.      Hernia: No hernia is present.   Skin:     General: Skin is warm and dry.       Coloration: Skin is not pale.   Neurological:      Mental Status: He is alert and oriented to person, place, and time.      Cranial Nerves: No cranial nerve deficit.      Sensory: No sensory deficit.      Motor: No tremor, abnormal muscle tone or seizure activity.      Coordination: Coordination normal.      Gait: Gait normal.      Deep Tendon Reflexes: Reflexes are normal and symmetric.         Data / Lab Results:    No results found for: HGBA1C     No results found for: LDL, LDLDIRECT  No results found for: CHOL  No results found for: TRIG  No results found for: HDL  No results found for: PSA  Lab Results   Component Value Date    WBC 4.66 04/14/2023    HGB 16.3 04/14/2023    HCT 53.9 (H) 04/14/2023    MCV 99.4 04/14/2023     04/14/2023     No results found for: TSH, Y1WPICI, Z3OQCGE, THYROIDAB   No results found for: GLUCOSE, BUN, CREATININE, EGFRIFNONA, EGFRIFAFRI, BCR, K, CO2, CALCIUM, PROTENTOTREF, ALBUMIN, LABIL2, BILIRUBIN, AST, ALT  No results found for: CLARIEC, RF, SEDRATE   No results found for: CRP   No results found for: IRON, TIBC, FERRITIN   Lab Results   Component Value Date    [id number] 12/30/2021          Assessment & Plan      Medications        Problem List         LOS      Acute bacterial sinusitis..  No fever, well-hydrated on exam, no respiratory difficulty.  Start antibiotics .  Increase fluid intake.  Signs symptoms dehydration discussed.  Call return if fever worsening symptoms.  On Vumerity followed by rheumatology.  MS.  Improved currently, not tolerating Vumerity, weaning dose, considering Tysabri Rx,  followed by neurology, recent repeat imagery with active lesion by 1.  Followed by neurology Dr. Gonzalez.  Overall improved on increased dose of gabapentin., significant numbness, intermittent left-sided weakness, episodes of bladder incontinence.. Had been off treatment for several years secondary/no insurance.  History of Gilenya Rx.  Risk of significant morbidity  from progression of MS without treatment/he understands gabapentin treats the symptoms but does not modulate his immune response or affect the long-term course of MS...  Multiple lesions with active lesion on l parietal lobe corresponding with right sided weakness 2013, + prior h/o optic neuritis resulting in vision loss, has been followed by optho in the past.  Likely MS currently with significant fatigue, unable to work more than 3 or 4 hours consecutively, intermittent numbness/weakness of both legs.  Has had benign CBC, CMP per rheumatology, recommend check TSH/fasting lipid panel, availability of screening blood work through health care discussed.  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  Depression.  Improved today on citalopram. good social support.  Follow-up recheck.  Call return if worsening symptoms.  History of tobacco use.  Congrats on quitting.  Migraine headaches.  Improved currently on gabapentin.  Consider Imitrex if persistent symptoms.  Triggers discussed.  GERD.  Stable on PPI.  Restless legs.    Clinically improved today.  Did not tolerate Requip.  Allergic reaction to Zithromax.  Improved/resolved currently, with throat swelling, Zithromax added to his allergy list.  follow-up visit for comprehensive physical exam and screening tests scheduled.  Recommend COVID-19 vaccine he declines.        Diagnoses and all orders for this visit:    1. Other recurrent depressive disorders (Primary)    2. Multiple sclerosis (HCC)  -     gabapentin (NEURONTIN) 300 MG capsule; Take 1 capsule by mouth 3 (Three) Times a Day.  Dispense: 90 capsule; Refill: 2    3. Tobacco use    4. Overweight with body mass index (BMI) of 29 to 29.9 in adult  Assessment & Plan:  Patient's (Body mass index is 29.45 kg/m².) indicates that they are overweight with health conditions that include dyslipidemias . Weight is unchanged. BMI is is above average; BMI  management plan is completed. We discussed portion control and increasing exercise.       5. Multiple sclerosis  -     gabapentin (NEURONTIN) 300 MG capsule; Take 1 capsule by mouth 3 (Three) Times a Day.  Dispense: 90 capsule; Refill: 2    6. Sinusitis, unspecified chronicity, unspecified location  -     ciprofloxacin (CIPRO) 500 MG tablet; Take 1 tablet by mouth 2 (Two) Times a Day.  Dispense: 20 tablet; Refill: 0            Expected course, medications, and adverse effects discussed.  Call or return if worsening or persistent symptoms.  I wore protective equipment throughout this patient encounter including a mask, gloves, and eye protection.  Hand hygiene was performed before donning protective equipment and after removal when leaving the room. The complete contents of the Assessment and Plan and Data/Lab Results as documented above have been reviewed and addressed by myself with the patient today as part of an ongoing evaluation / treatment plan.  If some of the documentation has been copied from a previous note and is unchanged it indicates that this problem / plan has been assessed today but is stable from a previous visit and no changes have been recommended.

## 2023-05-05 ENCOUNTER — OFFICE VISIT (OUTPATIENT)
Dept: FAMILY MEDICINE CLINIC | Facility: CLINIC | Age: 43
End: 2023-05-05
Payer: COMMERCIAL

## 2023-05-05 VITALS
HEART RATE: 84 BPM | RESPIRATION RATE: 16 BRPM | SYSTOLIC BLOOD PRESSURE: 124 MMHG | TEMPERATURE: 98.2 F | HEIGHT: 74 IN | BODY MASS INDEX: 29.44 KG/M2 | OXYGEN SATURATION: 97 % | WEIGHT: 229.4 LBS | DIASTOLIC BLOOD PRESSURE: 80 MMHG

## 2023-05-05 DIAGNOSIS — Z72.0 TOBACCO USE: ICD-10-CM

## 2023-05-05 DIAGNOSIS — G35 MULTIPLE SCLEROSIS: ICD-10-CM

## 2023-05-05 DIAGNOSIS — E66.3 OVERWEIGHT WITH BODY MASS INDEX (BMI) OF 29 TO 29.9 IN ADULT: ICD-10-CM

## 2023-05-05 DIAGNOSIS — J32.9 SINUSITIS, UNSPECIFIED CHRONICITY, UNSPECIFIED LOCATION: ICD-10-CM

## 2023-05-05 DIAGNOSIS — F33.8 OTHER RECURRENT DEPRESSIVE DISORDERS: Primary | ICD-10-CM

## 2023-05-05 PROCEDURE — 99213 OFFICE O/P EST LOW 20 MIN: CPT | Performed by: FAMILY MEDICINE

## 2023-05-05 RX ORDER — GABAPENTIN 300 MG/1
300 CAPSULE ORAL 3 TIMES DAILY
Qty: 90 CAPSULE | Refills: 2 | Status: SHIPPED | OUTPATIENT
Start: 2023-05-05

## 2023-05-05 RX ORDER — CIPROFLOXACIN 500 MG/1
500 TABLET, FILM COATED ORAL 2 TIMES DAILY
Qty: 20 TABLET | Refills: 0 | Status: SHIPPED | OUTPATIENT
Start: 2023-05-05

## 2023-05-05 NOTE — ASSESSMENT & PLAN NOTE
Patient's (Body mass index is 29.45 kg/m².) indicates that they are overweight with health conditions that include dyslipidemias . Weight is unchanged. BMI is is above average; BMI management plan is completed. We discussed portion control and increasing exercise.

## 2023-05-20 DIAGNOSIS — G35 MULTIPLE SCLEROSIS: ICD-10-CM

## 2023-05-22 RX ORDER — GABAPENTIN 300 MG/1
CAPSULE ORAL
Qty: 90 CAPSULE | Refills: 2 | Status: SHIPPED | OUTPATIENT
Start: 2023-05-22

## 2023-07-30 DIAGNOSIS — F33.8 OTHER RECURRENT DEPRESSIVE DISORDERS: ICD-10-CM

## 2023-07-31 RX ORDER — CITALOPRAM 20 MG/1
20 TABLET ORAL DAILY
Qty: 30 TABLET | Refills: 2 | Status: SHIPPED | OUTPATIENT
Start: 2023-07-31

## 2023-08-08 NOTE — PROGRESS NOTES
Subjective   Frederic Wood is a 42 y.o. male.     Chief Complaint   Patient presents with    Annual Exam    Hyperlipidemia    Multiple Sclerosis       The patient is here: for coordination of medical care to discuss health maintenance and disease prevention to follow up on multiple medical problems.  Last comprehensive physical was on .  Previous physical was performed by  Denton Dillon MD  Overall has: vigorous activity with work/home activities, exercises 2 - 3 times per week, good appetite, feels well with minor complaints, decreased energy level, and is sleeping well. Nutrition: appropriate balanced diet and eating a variety of foods. Last tetanus shot was unknown.     Multiple Sclerosis  This is a chronic problem. The current episode started more than 1 year ago. The problem has been gradually worsening. Pertinent negatives include no abdominal pain, chest pain, chills, diaphoresis or nausea. The symptoms are aggravated by exertion and walking. Treatments tried: Gabapentin.   Depression  Visit Type: follow-up  Patient presents with the following symptoms: depressed mood, insomnia ( periods of not sleeping well), irritability, nervousness/anxiety and panic.  Patient is not experiencing: palpitations, shortness of breath and suicidal ideas.  Frequency of symptoms: most days   Severity: interfering with daily activities   Sleep per night: 7 hours (some days he gets only 3-4 hours)  Sleep quality: good  Nighttime awakenings: one to two  Compliance with medications:  %    Hyperlipidemia  This is a recurrent problem. The current episode started more than 1 month ago. There are no known factors aggravating his hyperlipidemia. Pertinent negatives include no chest pain or shortness of breath. He is currently on no antihyperlipidemic treatment. There are no compliance problems.  Risk factors for coronary artery disease include male sex.      Recent Hospitalizations:  No hospitalization(s) within the last  year..  ccc      I personally reviewed and updated the patient's allergies, medications, problem list, and past medical, surgical, social, and family history. I have reviewed and confirmed the accuracy of the HPI and ROS as documented by the MA/LPN/RN Denton Dillon MD    Family History   Problem Relation Age of Onset    Osteoporosis Mother     Diabetes Mother     Hypertension Mother     Parkinsonism Father     Cancer Maternal Grandfather     Colon cancer Maternal Grandfather     Heart attack Paternal Grandmother     Stroke Paternal Grandmother     Heart attack Paternal Grandfather     Stroke Paternal Grandfather        Social History     Tobacco Use    Smoking status: Former     Packs/day: 1.50     Years: 18.00     Pack years: 27.00     Types: Cigarettes     Start date:      Quit date:      Years since quittin.6     Passive exposure: Never    Smokeless tobacco: Never    Tobacco comments:     Quit in  and picked back up when stressed, cut back to pack a day in .    Vaping Use    Vaping Use: Never used   Substance Use Topics    Alcohol use: Not Currently    Drug use: Never       History reviewed. No pertinent surgical history.    Patient Active Problem List   Diagnosis    Allergic rhinitis    Esophageal reflux    History of tobacco use    Hyperlipidemia    Multiple sclerosis (HCC)    Overweight with body mass index (BMI) of 29 to 29.9 in adult    Extremity numbness    Persistent migraine aura without cerebral infarction and without status migrainosus, not intractable    Dizziness    Numbness and tingling of both legs    Influenza A    Other recurrent depressive disorders    Chronic fatigue         Current Outpatient Medications:     amantadine (SYMMETREL) 100 MG tablet, TAKE 1 TABLET BY MOUTH IN THE AM FOR 1 WEEK, THEN INCREASE TO 1 TABLET IN MORNING AND AT NOON, Disp: , Rfl:     citalopram (CeleXA) 20 MG tablet, TAKE 1 TABLET BY MOUTH DAILY, Disp: 30 tablet, Rfl: 2    Cyanocobalamin (Vitamin B  12) 100 MCG lozenge, Take  by mouth., Disp: , Rfl:     fluticasone (FLONASE) 50 MCG/ACT nasal spray, 1 spray into the nostril(s) as directed by provider Daily., Disp: , Rfl:     gabapentin (NEURONTIN) 300 MG capsule, TAKE 1 CAPSULE BY MOUTH THREE TIMES DAILY, Disp: 90 capsule, Rfl: 2    Loratadine 10 MG capsule, Take  by mouth., Disp: , Rfl:     Magnesium 250 MG tablet, Take  by mouth., Disp: , Rfl:     omeprazole (priLOSEC) 40 MG capsule, Take 1 capsule by mouth Daily., Disp: 90 capsule, Rfl: 3    Thiamine HCl (vitamin B-1) 50 MG tablet, Take 1 tablet by mouth Daily., Disp: , Rfl:     Diroximel Fumarate, Starter, (Vumerity, Starter,) 231 MG capsule delayed-release, Take 2 capsules by mouth. (Patient not taking: Reported on 8/9/2023), Disp: , Rfl:     ergocalciferol (ERGOCALCIFEROL) 1.25 MG (79344 UT) capsule, Take 1 capsule by mouth Every 7 (Seven) Days. (Patient not taking: Reported on 8/9/2023), Disp: , Rfl:     Review of Systems   Constitutional:  Positive for irritability. Negative for chills and diaphoresis.   HENT:  Negative for trouble swallowing and voice change.    Eyes:  Negative for visual disturbance.   Respiratory:  Negative for shortness of breath.    Cardiovascular:  Negative for chest pain and palpitations.   Gastrointestinal:  Negative for abdominal pain and nausea.   Endocrine: Negative for polydipsia and polyphagia.   Genitourinary:  Negative for hematuria.   Musculoskeletal:  Negative for neck stiffness.   Skin:  Negative for color change and pallor.   Allergic/Immunologic: Negative for immunocompromised state.   Neurological:  Negative for seizures and syncope.   Hematological:  Negative for adenopathy.   Psychiatric/Behavioral:  Positive for depressed mood. Negative for hallucinations, sleep disturbance and suicidal ideas. The patient is nervous/anxious and has insomnia ( periods of not sleeping well).      I have reviewed and confirmed the accuracy of the ROS as documented by the MA/LPN/RN  "Denton Dillon MD      Objective   /88 (BP Location: Right arm, Patient Position: Sitting, Cuff Size: Large Adult)   Pulse 78   Resp 20   Ht 188 cm (74.02\")   Wt 106 kg (233 lb)   SpO2 99%   BMI 29.90 kg/mý   BP Readings from Last 3 Encounters:   08/09/23 118/88   05/05/23 124/80   02/03/23 124/76     Wt Readings from Last 3 Encounters:   08/09/23 106 kg (233 lb)   05/05/23 104 kg (229 lb 6.4 oz)   02/03/23 105 kg (232 lb 3.2 oz)     Physical Exam  Constitutional:       Appearance: He is well-developed. He is not diaphoretic.   HENT:      Head: Normocephalic.      Right Ear: Hearing, tympanic membrane, ear canal and external ear normal.      Left Ear: Hearing, tympanic membrane, ear canal and external ear normal.      Nose: Nose normal. No mucosal edema or congestion.      Right Sinus: No maxillary sinus tenderness or frontal sinus tenderness.      Left Sinus: No maxillary sinus tenderness or frontal sinus tenderness.      Mouth/Throat:      Mouth: No oral lesions.      Pharynx: Uvula midline. No oropharyngeal exudate or posterior oropharyngeal erythema.      Tonsils: No tonsillar exudate.   Eyes:      General: Lids are normal.      Conjunctiva/sclera: Conjunctivae normal.      Pupils: Pupils are equal, round, and reactive to light.   Neck:      Thyroid: No thyromegaly.      Vascular: No carotid bruit or JVD.      Trachea: No tracheal deviation.   Cardiovascular:      Rate and Rhythm: Normal rate and regular rhythm.      Heart sounds: Normal heart sounds. No murmur heard.    No friction rub. No gallop.   Pulmonary:      Effort: Pulmonary effort is normal.      Breath sounds: Normal breath sounds. No stridor. No decreased breath sounds, wheezing or rales.   Abdominal:      General: Bowel sounds are normal. There is no distension.      Palpations: Abdomen is soft. There is no mass.      Tenderness: There is no abdominal tenderness. There is no guarding or rebound.      Hernia: No hernia is present. "   Lymphadenopathy:      Head:      Right side of head: No submental, submandibular, tonsillar, preauricular, posterior auricular or occipital adenopathy.      Left side of head: No submental, submandibular, tonsillar, preauricular, posterior auricular or occipital adenopathy.      Cervical: No cervical adenopathy.   Skin:     General: Skin is warm and dry.      Coloration: Skin is not pale.   Neurological:      Mental Status: He is alert and oriented to person, place, and time.      Cranial Nerves: No cranial nerve deficit.      Sensory: No sensory deficit.      Motor: No tremor, abnormal muscle tone or seizure activity.      Coordination: Coordination normal.      Gait: Gait normal.      Deep Tendon Reflexes: Reflexes are normal and symmetric.       Data / Lab Results:    No results found for: HGBA1C     Lab Results   Component Value Date     (H) 07/31/2023     No results found for: CHOL  Lab Results   Component Value Date    TRIG 213 (H) 07/31/2023     Lab Results   Component Value Date    HDL 41 07/31/2023     No results found for: PSA  Lab Results   Component Value Date    WBC 6.7 07/31/2023    HGB 15.4 07/31/2023    HCT 45.1 07/31/2023    MCV 89 07/31/2023     07/31/2023     Lab Results   Component Value Date    TSH 2.060 07/31/2023     Lab Results   Component Value Date    GLUCOSE 99 07/31/2023    BUN 10 07/31/2023    CREATININE 0.93 07/31/2023    BCR 11 07/31/2023    K 4.4 07/31/2023    CO2 24 07/31/2023    CALCIUM 9.2 07/31/2023    PROTENTOTREF 7.0 07/31/2023    ALBUMIN 4.4 07/31/2023    LABIL2 1.7 07/31/2023    AST 15 07/31/2023    ALT 22 07/31/2023     No results found for: CLARICE, RF, SEDRATE   No results found for: CRP   No results found for: IRON, TIBC, FERRITIN   Lab Results   Component Value Date    [id number] 799 12/30/2021        Age-appropriate Screening Schedule:  Refer to the list below for future screening recommendations based on patient's age, sex and/or medical conditions. Orders  for these recommended tests are listed in the plan section. The patient has been provided with a written plan.    Health Maintenance   Topic Date Due    COVID-19 Vaccine (1) Never done    TDAP/TD VACCINES (1 - Tdap) Never done    HEPATITIS C SCREENING  Never done    INFLUENZA VACCINE  10/01/2023    LIPID PANEL  07/31/2024    ANNUAL PHYSICAL  08/09/2024    Pneumococcal Vaccine 0-64  Aged Out           Assessment & Plan      Medications        Problem List         LOS      Physical.  Doing well, vaccines updated.  Discussed coated baby aspirin daily.  Discussed health maintenance, screening test, next modification.  Acute bacterial sinusitis..  No fever, well-hydrated on exam, no respiratory difficulty.  Start antibiotics .  Increase fluid intake.  Signs symptoms dehydration discussed.  Call return if fever worsening symptoms.  On Tysabri followed by rheumatology.  Spider bite.  Likely.  Overall benign exam.  Cover with antibiotics.  Call return if worsening symptoms.  He agrees to update his tetanus at the pharmacy.  MS.  Improved currently, not tolerating Vumerity, recently started Tysabri Rx,  followed by neurology, recent repeat imagery with active lesion by 1.  Followed by neurology Dr. Gonzalez.  Overall improved on increased dose of gabapentin., significant numbness, intermittent left-sided weakness, episodes of bladder incontinence.. Had been off treatment for several years secondary/no insurance.  History of Gilenya Rx.  Risk of significant morbidity from progression of MS without treatment/he understands gabapentin treats the symptoms but does not modulate his immune response or affect the long-term course of MS...  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  Likely MS currently with significant fatigue, unable to work more than 3 or 4 hours consecutively,  intermittent numbness/weakness of both legs.  Has had benign CBC, CMP per rheumatology, recommend check TSH/fasting lipid panel, availability of screening blood work through health care discussed.  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  Depression.  Improved today on citalopram, dose increased.. good social support.  Follow-up recheck.  Call return if worsening symptoms.  Significant fatigue add wellbutrin.  History of tobacco use.  Congrats on quitting.  Migraine headaches.  Improved currently on gabapentin.  Consider Imitrex if persistent symptoms.  Triggers discussed.  GERD.  Stable on PPI.  Restless legs.    Clinically improved today.  Did not tolerate Requip.  Allergic reaction to Zithromax.  Improved/resolved currently, with throat swelling, Zithromax added to his allergy list.  follow-up visit for comprehensive physical exam and screening tests scheduled.  Recommend COVID-19 vaccine he declines.  Spider bite.  Overall benign exam today, with 2nd infxn, start antibiotics.  Call or return if worsening or persistent symptoms.        Diagnoses and all orders for this visit:    1. Encounter for wellness examination in adult (Primary)    2. Multiple sclerosis (HCC)    3. Mixed hyperlipidemia    4. Overweight with body mass index (BMI) of 29 to 29.9 in adult  Assessment & Plan:  Patient's (Body mass index is 29.9 kg/mý.) indicates that they are overweight with health conditions that include dyslipidemias and GERD . Weight is unchanged. BMI is is above average; BMI management plan is completed. We discussed portion control and increasing exercise.       5. History of tobacco use    6. Other recurrent depressive disorders    7. Sinusitis, unspecified chronicity, unspecified location    8. Need for pneumococcal vaccination  -     Pneumococcal Conjugate Vaccine 20-Valent All    9. Numbness and tingling of both legs    10. Chronic  fatigue              Expected course, medications, and adverse effects discussed.  Call or return if worsening or persistent symptoms.  I wore protective equipment throughout this patient encounter including a mask, gloves, and eye protection.  Hand hygiene was performed before donning protective equipment and after removal when leaving the room. The complete contents of the Assessment and Plan and Data / Lab Results as documented above have been reviewed and addressed by myself with the patient today as part of an ongoing evaluation / treatment plan.  If some of the documentation has been copied from a previous note and is unchanged it indicates that this problem / plan has been assessed today but is stable from a previous visit and no changes have been recommended.  The separate E/M service provided today is significant, medically necessary, and separately identifiable.

## 2023-08-09 ENCOUNTER — OFFICE VISIT (OUTPATIENT)
Dept: FAMILY MEDICINE CLINIC | Facility: CLINIC | Age: 43
End: 2023-08-09
Payer: COMMERCIAL

## 2023-08-09 VITALS
DIASTOLIC BLOOD PRESSURE: 88 MMHG | WEIGHT: 233 LBS | SYSTOLIC BLOOD PRESSURE: 118 MMHG | HEART RATE: 78 BPM | BODY MASS INDEX: 29.9 KG/M2 | HEIGHT: 74 IN | RESPIRATION RATE: 20 BRPM | OXYGEN SATURATION: 99 %

## 2023-08-09 DIAGNOSIS — G35 MULTIPLE SCLEROSIS: ICD-10-CM

## 2023-08-09 DIAGNOSIS — E66.3 OVERWEIGHT WITH BODY MASS INDEX (BMI) OF 29 TO 29.9 IN ADULT: ICD-10-CM

## 2023-08-09 DIAGNOSIS — F33.8 OTHER RECURRENT DEPRESSIVE DISORDERS: ICD-10-CM

## 2023-08-09 DIAGNOSIS — Z00.00 ENCOUNTER FOR WELLNESS EXAMINATION IN ADULT: Primary | ICD-10-CM

## 2023-08-09 DIAGNOSIS — J32.9 SINUSITIS, UNSPECIFIED CHRONICITY, UNSPECIFIED LOCATION: ICD-10-CM

## 2023-08-09 DIAGNOSIS — R53.82 CHRONIC FATIGUE: ICD-10-CM

## 2023-08-09 DIAGNOSIS — R20.2 NUMBNESS AND TINGLING OF BOTH LEGS: ICD-10-CM

## 2023-08-09 DIAGNOSIS — Z23 NEED FOR PNEUMOCOCCAL VACCINATION: ICD-10-CM

## 2023-08-09 DIAGNOSIS — R20.0 NUMBNESS AND TINGLING OF BOTH LEGS: ICD-10-CM

## 2023-08-09 DIAGNOSIS — Z87.891 HISTORY OF TOBACCO USE: ICD-10-CM

## 2023-08-09 DIAGNOSIS — E78.2 MIXED HYPERLIPIDEMIA: ICD-10-CM

## 2023-08-09 RX ORDER — DOXYCYCLINE 100 MG/1
100 CAPSULE ORAL 2 TIMES DAILY
Qty: 20 CAPSULE | Refills: 0 | Status: SHIPPED | OUTPATIENT
Start: 2023-08-09

## 2023-08-09 RX ORDER — CITALOPRAM 40 MG/1
40 TABLET ORAL DAILY
Qty: 90 TABLET | Refills: 3 | Status: SHIPPED | OUTPATIENT
Start: 2023-08-09

## 2023-08-09 RX ORDER — BUPROPION HYDROCHLORIDE 150 MG/1
150 TABLET ORAL DAILY
Qty: 90 TABLET | Refills: 3 | Status: SHIPPED | OUTPATIENT
Start: 2023-08-09

## 2023-08-09 NOTE — ASSESSMENT & PLAN NOTE
Patient's (Body mass index is 29.9 kg/mý.) indicates that they are overweight with health conditions that include dyslipidemias and GERD . Weight is unchanged. BMI is is above average; BMI management plan is completed. We discussed portion control and increasing exercise.

## 2023-10-02 ENCOUNTER — TELEPHONE (OUTPATIENT)
Dept: FAMILY MEDICINE CLINIC | Facility: CLINIC | Age: 43
End: 2023-10-02

## 2023-10-02 NOTE — TELEPHONE ENCOUNTER
Caller: Frederic Wood    Relationship: Self    Best call back number [phone]    What medication are you requesting: SOMETHING TO REPLACE WELLBUTRIN    What are your current symptoms: MEDICINE IS CAUSING DIZZY SPELLS        Have you had these symptoms before:    [x] Yes  [] No    Have you been treated for these symptoms before:   [x] Yes  [] No    If a prescription is needed, what is your preferred pharmacy and phone number: [id number] - [place] [address] [address] AT [facility] OF  &  - [phone]  - [phone] FX     Additional notes:  PATIENT STATES HE WOULD GET DIZZY SPELLS ABOUT 5 PM. HE STOPPED TAKING THE MEDICINE (WELLBUTRIN) AND DIZZINESS WENT AWAY. HE NOTICED THE DIFFERENCE THE FIRST DOSE HE STOPPED.

## 2023-10-04 NOTE — TELEPHONE ENCOUNTER
Let him know I am sorry to hear that the Wellbutrin was bothering him, okay to discontinue this 1 for now and would just stay with the citalopram, thanks

## 2023-11-28 NOTE — PROGRESS NOTES
Subjective   Frederic Wood is a 42 y.o. male.     Chief Complaint   Patient presents with    Multiple Sclerosis    Depression    Hyperlipidemia       Multiple Sclerosis  This is a chronic problem. The current episode started more than 1 year ago. The problem has been gradually worsening. Associated symptoms include headaches. Pertinent negatives include no abdominal pain, chest pain, chills, diaphoresis, nausea or urinary symptoms. The symptoms are aggravated by exertion and walking. Treatments tried: Gabapentin. The treatment provided mild relief.   Depression  Visit Type: follow-up  Patient presents with the following symptoms: insomnia ( periods of not sleeping well), irritability, nervousness/anxiety and panic.  Patient is not experiencing: depressed mood, palpitations, shortness of breath and suicidal ideas.  Frequency of symptoms: most days   Severity: interfering with daily activities   Sleep per night: 7 hours (some days he gets only 3-4 hours)  Sleep quality: fair  Nighttime awakenings: one to two  Compliance with medications:  %    Hyperlipidemia  This is a recurrent problem. The current episode started more than 1 month ago. There are no known factors aggravating his hyperlipidemia. Pertinent negatives include no chest pain, leg pain or shortness of breath. He is currently on no antihyperlipidemic treatment. There are no compliance problems.  Risk factors for coronary artery disease include male sex.            I personally reviewed and updated the patient's allergies, medications, problem list, and past medical, surgical, social, and family history. I have reviewed and confirmed the accuracy of the History of Present Illness and Review of Symptoms as documented by the MA/LPN/RN. Alison Guajardo MA    Family History   Problem Relation Age of Onset    Osteoporosis Mother     Diabetes Mother     Hypertension Mother     Parkinsonism Father     Cancer Maternal Grandfather     Colon cancer Maternal  Grandfather     Heart attack Paternal Grandmother     Stroke Paternal Grandmother     Heart attack Paternal Grandfather     Stroke Paternal Grandfather        Social History     Tobacco Use    Smoking status: Former     Packs/day: 1.50     Years: 18.00     Additional pack years: 0.00     Total pack years: 27.00     Types: Cigarettes     Start date:      Quit date:      Years since quittin.9     Passive exposure: Never    Smokeless tobacco: Never    Tobacco comments:     Quit in 2019 and picked back up when stressed, cut back to pack a day in .    Vaping Use    Vaping Use: Never used   Substance Use Topics    Alcohol use: Not Currently    Drug use: Never       No past surgical history on file.    Patient Active Problem List   Diagnosis    Allergic rhinitis    Esophageal reflux    History of tobacco use    Hyperlipidemia    Multiple sclerosis (HCC)    Overweight with body mass index (BMI) of 29 to 29.9 in adult    Extremity numbness    Persistent migraine aura without cerebral infarction and without status migrainosus, not intractable    Dizziness    Numbness and tingling of both legs    Influenza A    Other recurrent depressive disorders    Chronic fatigue         Current Outpatient Medications:     buPROPion XL (WELLBUTRIN XL) 150 MG 24 hr tablet, Take 1 tablet by mouth Daily., Disp: 90 tablet, Rfl: 3    citalopram (CeleXA) 40 MG tablet, Take 1 tablet by mouth Daily., Disp: 90 tablet, Rfl: 3    fluticasone (FLONASE) 50 MCG/ACT nasal spray, 1 spray into the nostril(s) as directed by provider Daily., Disp: , Rfl:     gabapentin (NEURONTIN) 300 MG capsule, TAKE 1 CAPSULE BY MOUTH THREE TIMES DAILY, Disp: 90 capsule, Rfl: 2    Loratadine 10 MG capsule, Take  by mouth., Disp: , Rfl:     Magnesium 250 MG tablet, Take  by mouth., Disp: , Rfl:     omeprazole (priLOSEC) 40 MG capsule, Take 1 capsule by mouth Daily., Disp: 90 capsule, Rfl: 3    amantadine (SYMMETREL) 100 MG tablet, TAKE 1 TABLET BY MOUTH IN  "THE AM FOR 1 WEEK, THEN INCREASE TO 1 TABLET IN MORNING AND AT NOON (Patient not taking: Reported on 11/29/2023), Disp: , Rfl:     Cyanocobalamin (Vitamin B 12) 100 MCG lozenge, Take  by mouth. (Patient not taking: Reported on 11/29/2023), Disp: , Rfl:     Diroximel Fumarate, Starter, (Vumerity, Starter,) 231 MG capsule delayed-release, Take 2 capsules by mouth. (Patient not taking: Reported on 8/9/2023), Disp: , Rfl:     doxycycline (MONODOX) 100 MG capsule, Take 1 capsule by mouth 2 (Two) Times a Day. (Patient not taking: Reported on 11/29/2023), Disp: 20 capsule, Rfl: 0    ergocalciferol (ERGOCALCIFEROL) 1.25 MG (59557 UT) capsule, Take 1 capsule by mouth Every 7 (Seven) Days. (Patient not taking: Reported on 8/9/2023), Disp: , Rfl:     Thiamine HCl (vitamin B-1) 50 MG tablet, Take 1 tablet by mouth Daily. (Patient not taking: Reported on 11/29/2023), Disp: , Rfl:          Review of Systems   Constitutional:  Positive for irritability. Negative for chills and diaphoresis.   Eyes:  Negative for visual disturbance.   Respiratory:  Negative for shortness of breath.    Cardiovascular:  Negative for chest pain and palpitations.   Gastrointestinal:  Negative for abdominal pain and nausea.   Endocrine: Negative for polydipsia and polyphagia.   Musculoskeletal:  Negative for neck stiffness.   Skin:  Negative for color change and pallor.   Neurological:  Negative for seizures and syncope.   Hematological:  Negative for adenopathy.   Psychiatric/Behavioral:  Negative for hallucinations, suicidal ideas and depressed mood. The patient is nervous/anxious and has insomnia ( periods of not sleeping well).        I have reviewed and confirmed the accuracy of the ROS as documented by the MA/LPN/RN Alison Guajardo MA      Objective   Pulse 84   Temp 98.6 °F (37 °C)   Resp 18   Ht 188 cm (74\")   Wt 108 kg (237 lb)   SpO2 97%   BMI 30.43 kg/m²   BP Readings from Last 3 Encounters:   08/09/23 118/88   05/05/23 124/80 "   02/03/23 124/76     Wt Readings from Last 3 Encounters:   11/29/23 108 kg (237 lb)   08/09/23 106 kg (233 lb)   05/05/23 104 kg (229 lb 6.4 oz)     Physical Exam  Constitutional:       Appearance: He is well-developed. He is not diaphoretic.   HENT:      Head: Normocephalic.      Right Ear: Tympanic membrane, ear canal and external ear normal.      Left Ear: Tympanic membrane, ear canal and external ear normal.      Nose: Nose normal.   Eyes:      General: Lids are normal.      Conjunctiva/sclera: Conjunctivae normal.      Pupils: Pupils are equal, round, and reactive to light.   Neck:      Thyroid: No thyromegaly.      Vascular: No carotid bruit or JVD.      Trachea: No tracheal deviation.   Cardiovascular:      Rate and Rhythm: Normal rate and regular rhythm.      Heart sounds: Normal heart sounds. No murmur heard.     No friction rub. No gallop.   Pulmonary:      Effort: Pulmonary effort is normal.      Breath sounds: Normal breath sounds. No stridor. No decreased breath sounds, wheezing or rales.   Abdominal:      General: Bowel sounds are normal. There is no distension.      Palpations: Abdomen is soft. There is no mass.      Tenderness: There is no abdominal tenderness. There is no guarding or rebound.      Hernia: No hernia is present.   Lymphadenopathy:      Head:      Right side of head: No submental, submandibular, tonsillar, preauricular, posterior auricular or occipital adenopathy.      Left side of head: No submental, submandibular, tonsillar, preauricular, posterior auricular or occipital adenopathy.      Cervical: No cervical adenopathy.   Skin:     General: Skin is warm and dry.      Coloration: Skin is not pale.   Neurological:      Mental Status: He is alert and oriented to person, place, and time.      Cranial Nerves: No cranial nerve deficit.      Sensory: No sensory deficit.      Motor: No tremor, abnormal muscle tone or seizure activity.      Coordination: Coordination normal.      Gait: Gait  "normal.      Deep Tendon Reflexes: Reflexes are normal and symmetric.         Data / Lab Results:    No results found for: \"HGBA1C\"  Lab Results   Component Value Date     06/13/2022     Lab Results   Component Value Date     (H) 07/31/2023     No results found for: \"CHOL\"  Lab Results   Component Value Date    TRIG 213 (H) 07/31/2023     Lab Results   Component Value Date    HDL 41 07/31/2023     No results found for: \"PSA\"  Lab Results   Component Value Date    WBC 6.7 07/31/2023    HGB 15.4 07/31/2023    HCT 45.1 07/31/2023    MCV 89 07/31/2023     07/31/2023     Lab Results   Component Value Date    TSH 2.060 07/31/2023      Lab Results   Component Value Date    GLUCOSE 99 07/31/2023    BUN 10 07/31/2023    CREATININE 0.93 07/31/2023    EGFRIFAFRI >60 06/13/2022    BCR 11 07/31/2023    K 4.4 07/31/2023    CO2 24 07/31/2023    CALCIUM 9.2 07/31/2023    PROTENTOTREF 7.0 07/31/2023    ALBUMIN 4.4 07/31/2023    LABIL2 1.7 07/31/2023    AST 15 07/31/2023    ALT 22 07/31/2023     No results found for: \"CLARICE\", \"RF\", \"SEDRATE\"   No results found for: \"CRP\"   No results found for: \"IRON\", \"TIBC\", \"FERRITIN\"   Lab Results   Component Value Date    [id number] 799 12/30/2021          Assessment & Plan      Medications        Problem List         LOS    Health maintenance.  Doing well, vaccines updated.  Discussed coated baby aspirin daily.  Discussed health maintenance, screening test, next modification.  MS.  Improved currently, not tolerating Vumerity, recently started Tysabri Rx, having difficulty with insurance coverage,  followed by neurology, recent repeat imagery with active lesion by 1.  Followed by neurology Dr. Gonzalez.  Overall improved on increased dose of gabapentin., significant numbness, intermittent left-sided weakness, episodes of bladder incontinence.. Had been off treatment for several years secondary/no insurance.  History of Gilenya Rx.  Risk of significant morbidity from progression " of MS without treatment/he understands gabapentin treats the symptoms but does not modulate his immune response or affect the long-term course of MS...  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  Likely MS currently with significant fatigue, unable to work more than 3 or 4 hours consecutively, intermittent numbness/weakness of both legs.  Has had benign CBC, CMP per rheumatology, recommend check TSH/fasting lipid panel, availability of screening blood work through health care discussed.  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  Overall doing weill on gabapentin attempting to wean dose.  Depression.  Improved today on increase dose citalopram.. good social support.  Follow-up recheck.  Call return if worsening symptoms.  Significant fatigue add wellbutrin.  History of tobacco use.  Congrats on quitting.  Migraine headaches.  Improved currently on gabapentin.  Consider Imitrex if persistent symptoms.  Triggers discussed.  GERD.  Stable on PPI.  Restless legs.    Clinically improved today.  Did not tolerate Requip.  Allergic reaction to Zithromax.  Improved/resolved currently, with throat swelling, Zithromax added to his allergy list.  follow-up visit for comprehensive physical exam and screening tests scheduled.  Recommend COVID-19 vaccine he declines.  Spider bite.  Overall benign exam today, with 2nd infxn, start antibiotics.  Call or return if worsening or persistent symptoms.         Diagnoses and all orders for this visit:    1. Other recurrent depressive disorders (Primary)    2. Multiple sclerosis (HCC)    3. Mixed hyperlipidemia    4. Overweight with body mass index (BMI) of 29 to 29.9 in adult    5. History of tobacco use              Expected course, medications, and adverse effects discussed.  Call or return  if worsening or persistent symptoms.  I wore protective equipment throughout this patient encounter including a mask, gloves, and eye protection.  Hand hygiene was performed before donning protective equipment and after removal when leaving the room. The complete contents of the Assessment and Plan and Data/Lab Results as documented above have been reviewed and addressed by myself with the patient today as part of an ongoing evaluation / treatment plan.  If some of the documentation has been copied from a previous note and is unchanged it indicates that this problem / plan has been assessed today but is stable from a previous visit and no changes have been recommended.

## 2023-11-29 ENCOUNTER — OFFICE VISIT (OUTPATIENT)
Dept: FAMILY MEDICINE CLINIC | Facility: CLINIC | Age: 43
End: 2023-11-29
Payer: COMMERCIAL

## 2023-11-29 VITALS
HEIGHT: 74 IN | WEIGHT: 237 LBS | OXYGEN SATURATION: 97 % | HEART RATE: 84 BPM | BODY MASS INDEX: 30.42 KG/M2 | TEMPERATURE: 98.6 F | SYSTOLIC BLOOD PRESSURE: 130 MMHG | DIASTOLIC BLOOD PRESSURE: 94 MMHG | RESPIRATION RATE: 18 BRPM

## 2023-11-29 DIAGNOSIS — F33.8 OTHER RECURRENT DEPRESSIVE DISORDERS: Primary | ICD-10-CM

## 2023-11-29 DIAGNOSIS — E78.2 MIXED HYPERLIPIDEMIA: ICD-10-CM

## 2023-11-29 DIAGNOSIS — E66.3 OVERWEIGHT WITH BODY MASS INDEX (BMI) OF 29 TO 29.9 IN ADULT: ICD-10-CM

## 2023-11-29 DIAGNOSIS — Z87.891 HISTORY OF TOBACCO USE: ICD-10-CM

## 2023-11-29 DIAGNOSIS — G35 MULTIPLE SCLEROSIS: ICD-10-CM

## 2023-11-29 RX ORDER — GABAPENTIN 300 MG/1
300 CAPSULE ORAL 3 TIMES DAILY
Qty: 90 CAPSULE | Refills: 2 | Status: SHIPPED | OUTPATIENT
Start: 2023-11-29

## 2024-02-20 DIAGNOSIS — K21.9 GASTROESOPHAGEAL REFLUX DISEASE WITHOUT ESOPHAGITIS: ICD-10-CM

## 2024-02-20 RX ORDER — OMEPRAZOLE 40 MG/1
40 CAPSULE, DELAYED RELEASE ORAL DAILY
Qty: 90 CAPSULE | Refills: 3 | Status: SHIPPED | OUTPATIENT
Start: 2024-02-20

## 2025-04-29 ENCOUNTER — OFFICE VISIT (OUTPATIENT)
Dept: FAMILY MEDICINE CLINIC | Facility: CLINIC | Age: 45
End: 2025-04-29

## 2025-04-29 VITALS
RESPIRATION RATE: 18 BRPM | HEART RATE: 92 BPM | TEMPERATURE: 98.4 F | WEIGHT: 220.8 LBS | HEIGHT: 74 IN | SYSTOLIC BLOOD PRESSURE: 118 MMHG | BODY MASS INDEX: 28.34 KG/M2 | DIASTOLIC BLOOD PRESSURE: 78 MMHG | OXYGEN SATURATION: 98 %

## 2025-04-29 DIAGNOSIS — K02.9 DENTAL CARIES: Primary | ICD-10-CM

## 2025-04-29 PROBLEM — J10.1 INFLUENZA A: Status: RESOLVED | Noted: 2022-12-15 | Resolved: 2025-04-29

## 2025-04-29 PROCEDURE — 99212 OFFICE O/P EST SF 10 MIN: CPT | Performed by: FAMILY MEDICINE

## 2025-04-29 RX ORDER — CLINDAMYCIN HYDROCHLORIDE 300 MG/1
300 CAPSULE ORAL 3 TIMES DAILY
Qty: 30 CAPSULE | Refills: 4 | Status: SHIPPED | OUTPATIENT
Start: 2025-04-29

## 2025-04-29 RX ORDER — CHLORAL HYDRATE 500 MG
1000 CAPSULE ORAL
COMMUNITY

## 2025-04-29 RX ORDER — CLINDAMYCIN HYDROCHLORIDE 300 MG/1
300 CAPSULE ORAL 3 TIMES DAILY
Qty: 30 CAPSULE | Refills: 0 | Status: SHIPPED | OUTPATIENT
Start: 2025-04-29 | End: 2025-04-29 | Stop reason: SDUPTHER

## 2025-04-29 NOTE — PROGRESS NOTES
"Chief Complaint  Dental Pain    Subjective     CC  Problem List  Visit Diagnosis   Encounters  Notes  Medications  Labs  Result Review Imaging  Media    Frederic Wood presents to [facility] for   History of Present Illness  Patient presents to the office with complaint of tooth pain in his lower right back tooth that started 3 days ago, reports he has a dental appointment scheduled but not until 6/17/25. He denies fever chills or SOA.   Dental Pain   This is a new problem. The current episode started in the past 7 days. The problem occurs constantly. The problem has been unchanged. The pain is at a severity of 2/10 (Usually 6 throughout the day). The pain is mild. Associated symptoms include facial pain and thermal sensitivity. Pertinent negatives include no difficulty swallowing, fever or oral bleeding. He has tried NSAIDs for the symptoms. The treatment provided mild relief.       Review of Systems   Constitutional:  Negative for fever.   HENT:  Positive for dental problem (right lower molar pain.).    Cardiovascular:  Negative for chest pain and palpitations.   Endocrine: Negative for cold intolerance, heat intolerance, polydipsia and polyuria.   Skin:  Negative for rash and bruise.   Hematological:  Negative for adenopathy. Does not bruise/bleed easily.        Objective   Vital Signs:   /78 (BP Location: Right arm, Patient Position: Sitting, Cuff Size: Adult)   Pulse 92   Temp 98.4 °F (36.9 °C) (Temporal)   Resp 18   Ht 188 cm (74\")   Wt 100 kg (220 lb 12.8 oz)   SpO2 98%   BMI 28.35 kg/m²     Physical Exam  Constitutional:       General: He is not in acute distress.  HENT:      Mouth/Throat:      Comments: There is a large cavity in the right lower tooth. He has multiple missing teeth. No gum swelling no compromise to airway.   Cardiovascular:      Rate and Rhythm: Normal rate and regular rhythm.      Heart sounds: No murmur heard.  Pulmonary:      " Effort: Pulmonary effort is normal.      Breath sounds: Normal breath sounds. No wheezing.   Musculoskeletal:      Cervical back: Neck supple.   Lymphadenopathy:      Cervical: No cervical adenopathy.   Skin:     Findings: No rash.   Neurological:      Mental Status: He is alert.        Result Review :Labs  Result Review  Imaging  Med Tab  Media                 Assessment and Plan CC Problem List  Visit Diagnosis  ROS  Review (Popup)  Health Maintenance  Quality  BestPractice  Medications  SmartSets  SnapShot Encounters  Media  Problem List Items Addressed This Visit    None  Visit Diagnoses         Dental caries    -  Primary    right lower molar, abx, he has extraction apt. He will notify us of worsening sxs.    Relevant Medications    clindamycin (CLEOCIN) 300 MG capsule            Follow Up Instructions Charge Capture  Follow-up Communications  Return if symptoms worsen or fail to improve.  Patient was given instructions and counseling regarding his condition or for health maintenance advice. Please see specific information pulled into the AVS if appropriate.